# Patient Record
Sex: FEMALE | Race: OTHER | HISPANIC OR LATINO
[De-identification: names, ages, dates, MRNs, and addresses within clinical notes are randomized per-mention and may not be internally consistent; named-entity substitution may affect disease eponyms.]

---
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
## 2022-02-08 ENCOUNTER — ASOB RESULT (OUTPATIENT)
Age: 22
End: 2022-02-08

## 2022-02-08 ENCOUNTER — APPOINTMENT (OUTPATIENT)
Dept: ANTEPARTUM | Facility: CLINIC | Age: 22
End: 2022-02-08
Payer: MEDICAID

## 2022-02-08 PROCEDURE — 76820 UMBILICAL ARTERY ECHO: CPT

## 2022-02-08 PROCEDURE — 76816 OB US FOLLOW-UP PER FETUS: CPT

## 2022-02-08 PROCEDURE — 76819 FETAL BIOPHYS PROFIL W/O NST: CPT

## 2022-03-03 ENCOUNTER — ASOB RESULT (OUTPATIENT)
Age: 22
End: 2022-03-03

## 2022-03-03 ENCOUNTER — APPOINTMENT (OUTPATIENT)
Dept: ANTEPARTUM | Facility: CLINIC | Age: 22
End: 2022-03-03
Payer: MEDICAID

## 2022-03-03 PROCEDURE — 76820 UMBILICAL ARTERY ECHO: CPT

## 2022-03-03 PROCEDURE — 93976 VASCULAR STUDY: CPT

## 2022-03-03 PROCEDURE — 76816 OB US FOLLOW-UP PER FETUS: CPT

## 2022-03-03 PROCEDURE — 76821 MIDDLE CEREBRAL ARTERY ECHO: CPT

## 2022-03-10 ENCOUNTER — APPOINTMENT (OUTPATIENT)
Dept: ANTEPARTUM | Facility: CLINIC | Age: 22
End: 2022-03-10
Payer: MEDICAID

## 2022-03-10 ENCOUNTER — ASOB RESULT (OUTPATIENT)
Age: 22
End: 2022-03-10

## 2022-03-10 PROCEDURE — 76819 FETAL BIOPHYS PROFIL W/O NST: CPT

## 2022-03-10 PROCEDURE — 93976 VASCULAR STUDY: CPT

## 2022-03-10 PROCEDURE — 76820 UMBILICAL ARTERY ECHO: CPT

## 2022-03-17 ENCOUNTER — APPOINTMENT (OUTPATIENT)
Dept: ANTEPARTUM | Facility: CLINIC | Age: 22
End: 2022-03-17
Payer: MEDICAID

## 2022-03-17 ENCOUNTER — ASOB RESULT (OUTPATIENT)
Age: 22
End: 2022-03-17

## 2022-03-17 PROCEDURE — 93976 VASCULAR STUDY: CPT

## 2022-03-17 PROCEDURE — 76820 UMBILICAL ARTERY ECHO: CPT

## 2022-03-17 PROCEDURE — 76819 FETAL BIOPHYS PROFIL W/O NST: CPT

## 2022-03-24 ENCOUNTER — APPOINTMENT (OUTPATIENT)
Dept: ANTEPARTUM | Facility: CLINIC | Age: 22
End: 2022-03-24
Payer: MEDICAID

## 2022-03-24 ENCOUNTER — ASOB RESULT (OUTPATIENT)
Age: 22
End: 2022-03-24

## 2022-03-24 PROCEDURE — 76819 FETAL BIOPHYS PROFIL W/O NST: CPT

## 2022-03-24 PROCEDURE — 76820 UMBILICAL ARTERY ECHO: CPT

## 2022-03-24 PROCEDURE — 76816 OB US FOLLOW-UP PER FETUS: CPT

## 2022-03-31 ENCOUNTER — APPOINTMENT (OUTPATIENT)
Dept: ANTEPARTUM | Facility: CLINIC | Age: 22
End: 2022-03-31
Payer: MEDICAID

## 2022-03-31 ENCOUNTER — ASOB RESULT (OUTPATIENT)
Age: 22
End: 2022-03-31

## 2022-03-31 PROCEDURE — 76819 FETAL BIOPHYS PROFIL W/O NST: CPT

## 2022-03-31 PROCEDURE — 76820 UMBILICAL ARTERY ECHO: CPT

## 2022-04-07 ENCOUNTER — ASOB RESULT (OUTPATIENT)
Age: 22
End: 2022-04-07

## 2022-04-07 ENCOUNTER — APPOINTMENT (OUTPATIENT)
Dept: ANTEPARTUM | Facility: CLINIC | Age: 22
End: 2022-04-07
Payer: MEDICAID

## 2022-04-07 PROCEDURE — 76820 UMBILICAL ARTERY ECHO: CPT

## 2022-04-07 PROCEDURE — 76819 FETAL BIOPHYS PROFIL W/O NST: CPT

## 2022-04-14 ENCOUNTER — APPOINTMENT (OUTPATIENT)
Dept: ANTEPARTUM | Facility: CLINIC | Age: 22
End: 2022-04-14
Payer: MEDICAID

## 2022-04-14 ENCOUNTER — ASOB RESULT (OUTPATIENT)
Age: 22
End: 2022-04-14

## 2022-04-14 PROCEDURE — ZZZZZ: CPT

## 2022-04-14 PROCEDURE — 76818 FETAL BIOPHYS PROFILE W/NST: CPT

## 2022-04-14 PROCEDURE — 76820 UMBILICAL ARTERY ECHO: CPT

## 2022-04-14 PROCEDURE — 76816 OB US FOLLOW-UP PER FETUS: CPT

## 2022-04-18 ENCOUNTER — APPOINTMENT (OUTPATIENT)
Dept: ANTEPARTUM | Facility: CLINIC | Age: 22
End: 2022-04-18
Payer: MEDICAID

## 2022-04-18 ENCOUNTER — ASOB RESULT (OUTPATIENT)
Age: 22
End: 2022-04-18

## 2022-04-18 PROCEDURE — 76818 FETAL BIOPHYS PROFILE W/NST: CPT

## 2022-04-18 PROCEDURE — ZZZZZ: CPT

## 2022-04-19 ENCOUNTER — OUTPATIENT (OUTPATIENT)
Dept: OUTPATIENT SERVICES | Facility: HOSPITAL | Age: 22
LOS: 1 days | End: 2022-04-19
Payer: COMMERCIAL

## 2022-04-19 VITALS
RESPIRATION RATE: 16 BRPM | DIASTOLIC BLOOD PRESSURE: 62 MMHG | TEMPERATURE: 98 F | SYSTOLIC BLOOD PRESSURE: 101 MMHG | HEART RATE: 110 BPM

## 2022-04-19 VITALS — SYSTOLIC BLOOD PRESSURE: 96 MMHG | DIASTOLIC BLOOD PRESSURE: 58 MMHG | HEART RATE: 93 BPM

## 2022-04-19 DIAGNOSIS — O47.1 FALSE LABOR AT OR AFTER 37 COMPLETED WEEKS OF GESTATION: ICD-10-CM

## 2022-04-19 PROCEDURE — G0463: CPT

## 2022-04-19 PROCEDURE — 59025 FETAL NON-STRESS TEST: CPT

## 2022-04-19 NOTE — OB RN TRIAGE NOTE - FALL HARM RISK - UNIVERSAL INTERVENTIONS
Bed in lowest position, wheels locked, appropriate side rails in place/Call bell, personal items and telephone in reach/Instruct patient to call for assistance before getting out of bed or chair/Non-slip footwear when patient is out of bed/[place] to call system/Physically safe environment - no spills, clutter or unnecessary equipment/Purposeful Proactive Rounding/Room/bathroom lighting operational, light cord in reach

## 2022-04-19 NOTE — OB PROVIDER TRIAGE NOTE - NSOBPROVIDERNOTE_OBGYN_ALL_OB_FT
A/P: 21y  at 36w6d weeks GA who presents to L&D for decreased FM, pelvic pressure  VSS  50/-3 on exam, no contractions,   NST reactive will continue to monitor for 30 more minutes  BPP , good fluid noted  Fetal echo scheduled for 22, next [facility] appointment     Dispo: likely home with follow up at next scheduled appointment    d/w Dr. Vidal A/P: 21y  at 36w6d weeks GA who presents to L&D for decreased FM, pelvic pressure  VSS  50/-3 on exam, no contractions,   NST reactive will continue to monitor for 30 more minutes  BPP , good fluid noted  Fetal echo scheduled for 22, next [facility] appointment     ---------  intermittent contractions noted on monitor, exam unchanged   Dispo: home with follow up at next scheduled appointment    d/w Dr. Vidal

## 2022-04-19 NOTE — OB PROVIDER TRIAGE NOTE - HISTORY OF PRESENT ILLNESS
MANPREET MALLOY is a 21y  at 36w6d weeks GA who presents to L&D for decreased FM x 1 day. She reports that baby has not been moving as much as usual. She also endorses pelvic pressure, denies contractions, leakage of fluid, vaginal bleeding.      Pregnancy course is significant for:  PACs, fetal arrythmia, pt has fetal echo for   FGR 8%ile, resolved     POB:   PGYN: -fibroids/-cysts, denied STD hx, denies abnormal PAPs  PMH: denies  PSH:denies  SH: Denies tobacco use, EtOH use and illicit drug use during the pregnancy; Feels safe at home  Meds: PNV  All:NKDA

## 2022-04-19 NOTE — OB PROVIDER TRIAGE NOTE - NSHPPHYSICALEXAM_GEN_ALL_CORE
T(C): 36.7 (04-19-22 @ 20:17), Max: 36.7 (04-19-22 @ 20:17)  HR: 110 (04-19-22 @ 20:26) (110 - 110)  BP: 101/62 (04-19-22 @ 20:26) (101/62 - 101/62)  RR: 16 (04-19-22 @ 20:17) (16 - 16)  SpO2: --  Gen: NAD, well-appearing  Heart: S1 S2, RRR  Lungs: CTAB  Abd: soft, gravid  Ext: non-edematous, non-tender   Bedside ultrasound. Vertex, anterior placenta, BPP 8/8, ALYSSA 12.3 with 2x2 pocket  SVE: 1/50/-3  FHT: reactive  [place]: no contractions

## 2022-04-21 ENCOUNTER — ASOB RESULT (OUTPATIENT)
Age: 22
End: 2022-04-21

## 2022-04-21 ENCOUNTER — APPOINTMENT (OUTPATIENT)
Dept: ANTEPARTUM | Facility: CLINIC | Age: 22
End: 2022-04-21
Payer: MEDICAID

## 2022-04-21 PROCEDURE — 76818 FETAL BIOPHYS PROFILE W/NST: CPT

## 2022-04-21 PROCEDURE — ZZZZZ: CPT

## 2022-04-22 ENCOUNTER — OUTPATIENT (OUTPATIENT)
Dept: INPATIENT UNIT | Facility: HOSPITAL | Age: 22
LOS: 1 days | End: 2022-04-22
Payer: COMMERCIAL

## 2022-04-22 VITALS
TEMPERATURE: 98 F | DIASTOLIC BLOOD PRESSURE: 56 MMHG | HEART RATE: 78 BPM | RESPIRATION RATE: 18 BRPM | SYSTOLIC BLOOD PRESSURE: 104 MMHG

## 2022-04-22 VITALS
DIASTOLIC BLOOD PRESSURE: 56 MMHG | SYSTOLIC BLOOD PRESSURE: 104 MMHG | TEMPERATURE: 98 F | HEART RATE: 78 BPM | RESPIRATION RATE: 18 BRPM

## 2022-04-22 DIAGNOSIS — O47.1 FALSE LABOR AT OR AFTER 37 COMPLETED WEEKS OF GESTATION: ICD-10-CM

## 2022-04-22 PROCEDURE — G0463: CPT

## 2022-04-22 PROCEDURE — 59025 FETAL NON-STRESS TEST: CPT

## 2022-04-22 NOTE — OB PROVIDER TRIAGE NOTE - HISTORY OF PRESENT ILLNESS
22 yo F  at 37w2d who presents to L&D for lower abdominal pain secondary to contractions x1 day. Patient states that she started having suprapubic abdominal pain this morning at 9AM associated with contractions. Contractions occur q10 minutes.  Denies vaginal bleeding, LOF. Endorses fetal movements.     Pregnancy is significant for:  - PACs, fetal arrythmia, pt has fetal echo for   - FGR 8%ile, resolved     POB:   PGYN: -fibroids/-cysts, denied STD hx, denies abnormal PAPs  PMH: denies  PSH: denies  SH: Denies tobacco use, EtOH use and illicit drug use during the pregnancy; Feels safe at home  Meds: PNV  All: NKDA   20 yo F  at 37w2d who presents to L&D for lower abdominal pain secondary to contractions x1 day. Patient states that she started having abdominal pain this morning at 9AM associated with contractions. Contractions occur q10 minutes.  Denies vaginal bleeding, leakage of fluid. Endorses fetal movements.     Pregnancy is significant for:  - PACs, fetal arrythmia, pt has fetal echo for   - FGR 8%ile, resolved     POB:   PGYN: -fibroids/-cysts, denied STD hx, denies abnormal PAPs  PMH: denies  PSH: denies  SH: Denies tobacco use, EtOH use and illicit drug use during the pregnancy; Feels safe at home  Meds: PNV  All: NKDA

## 2022-04-22 NOTE — OB RN TRIAGE NOTE - FALL HARM RISK - UNIVERSAL INTERVENTIONS
Bed in lowest position, wheels locked, appropriate side rails in place/Call bell, personal items and telephone in reach/Instruct patient to call for assistance before getting out of bed or chair/Non-slip footwear when patient is out of bed/[place] to call system/Physically safe environment - no spills, clutter or unnecessary equipment/Purposeful Proactive Rounding/Room/bathroom lighting operational, light cord in reach

## 2022-04-22 NOTE — OB PROVIDER TRIAGE NOTE - NSOBPROVIDERNOTE_OBGYN_ALL_OB_FT
20 yo F  at 37w2d who presents to L&D for lower abdominal pain secondary to contractions x1 day. Contractions are painful, irregular, and occur q10 minutes.     Plan:  - NST reactive, toco shows irregular contractions q10 mins  - 50/-3 on SVE, which is unchanged from 22.   - Fetal echo scheduled for 22, next [facility] appointment   - Dispo: home and followup at next scheduled appoint with outpt OB 20 yo F  at 37w2d who presents to L&D for lower abdominal pain secondary to contractions x1 day. Contractions are painful, irregular, and occur q10 minutes.     Plan:  - Encourage PO fluid intake for pain   - Reassured patient that contraction pain will self-resolve and no acute interventions at the moment   - NST reactive, toco shows irregular contractions q10 mins   - 50/-3 on SVE, which is unchanged from 22  - Fetal echo scheduled for 22, next [facility] appointment   - Dispo: home and followup at next scheduled appoint with outpt OB 22 yo F  at 37w2d who presents to L&D for lower abdominal pain secondary to contractions x1 day. Contractions are irregular, and occur roughly q10 minutes.     Plan:  - Encourage PO fluid intake for pain   - Reassured patient that contractions in later stages of pregnancy are normal. no acute interventions at the moment   - NST reactive, toco shows irregular contractions q10 mins   - 1/50/-3 on SVE, which is unchanged from 22  - Fetal echo scheduled for 22, next [facility] appointment   - Dispo: home and followup at next scheduled appoint with outpt OB, Labor precautions given.    d/w Dr. Vidal

## 2022-04-22 NOTE — OB PROVIDER TRIAGE NOTE - NSHPPHYSICALEXAM_GEN_ALL_CORE
Vital Signs Last 24 Hrs  T(C): 36.4 (22 Apr 2022 18:18), Max: 36.4 (22 Apr 2022 18:12)  T(F): 97.5 (22 Apr 2022 18:18), Max: 97.52 (22 Apr 2022 18:12)  HR: 78 (22 Apr 2022 18:18) (78 - 78)  BP: 104/56 (22 Apr 2022 18:18) (104/56 - 104/56)  RR: 18 (22 Apr 2022 18:18) (18 - 18)    Gen: Well-appearing, in no acute distress   Resp: CTAB  CV: RRR  Abd: Soft, non-tender, gravid   SVE: 1/50/-3   Ext: warm, well-perfused    FHT: Baseline 150 bpm, mod enedina, +accels, -decels   [place]: Irregular contractions, q10 minutes

## 2022-04-23 PROBLEM — Z78.9 OTHER SPECIFIED HEALTH STATUS: Chronic | Status: ACTIVE | Noted: 2022-04-19

## 2022-04-25 ENCOUNTER — ASOB RESULT (OUTPATIENT)
Age: 22
End: 2022-04-25

## 2022-04-25 ENCOUNTER — APPOINTMENT (OUTPATIENT)
Dept: ANTEPARTUM | Facility: CLINIC | Age: 22
End: 2022-04-25
Payer: MEDICAID

## 2022-04-25 PROCEDURE — 76818 FETAL BIOPHYS PROFILE W/NST: CPT

## 2022-04-25 PROCEDURE — ZZZZZ: CPT

## 2022-04-28 ENCOUNTER — APPOINTMENT (OUTPATIENT)
Dept: ANTEPARTUM | Facility: CLINIC | Age: 22
End: 2022-04-28

## 2022-04-28 ENCOUNTER — APPOINTMENT (OUTPATIENT)
Dept: PEDIATRIC CARDIOLOGY | Facility: CLINIC | Age: 22
End: 2022-04-28
Payer: MEDICAID

## 2022-04-28 DIAGNOSIS — O35.9XX0 MATERNAL CARE FOR (SUSPECTED) FETAL ABNORMALITY AND DAMAGE, UNSPECIFIED, NOT APPLICABLE OR UNSPECIFIED: ICD-10-CM

## 2022-04-28 DIAGNOSIS — O36.8390 MATERNAL CARE FOR ABNORMALITIES OF THE FETAL HEART RATE OR RHYTHM, UNSPECIFIED TRIMESTER, NOT APPLICABLE OR UNSPECIFIED: ICD-10-CM

## 2022-04-28 PROCEDURE — 93325 DOPPLER ECHO COLOR FLOW MAPG: CPT | Mod: 59

## 2022-04-28 PROCEDURE — 76820 UMBILICAL ARTERY ECHO: CPT

## 2022-04-28 PROCEDURE — 76825 ECHO EXAM OF FETAL HEART: CPT

## 2022-04-28 PROCEDURE — 99204 OFFICE O/P NEW MOD 45 MIN: CPT | Mod: 25

## 2022-04-28 PROCEDURE — 76827 ECHO EXAM OF FETAL HEART: CPT

## 2022-05-02 ENCOUNTER — APPOINTMENT (OUTPATIENT)
Dept: ANTEPARTUM | Facility: CLINIC | Age: 22
End: 2022-05-02
Payer: MEDICAID

## 2022-05-02 ENCOUNTER — ASOB RESULT (OUTPATIENT)
Age: 22
End: 2022-05-02

## 2022-05-02 PROCEDURE — 76818 FETAL BIOPHYS PROFILE W/NST: CPT

## 2022-05-02 PROCEDURE — ZZZZZ: CPT

## 2022-05-03 ENCOUNTER — INPATIENT (INPATIENT)
Facility: HOSPITAL | Age: 22
LOS: 2 days | Discharge: HOME | End: 2022-05-06
Attending: OBSTETRICS & GYNECOLOGY | Admitting: OBSTETRICS & GYNECOLOGY
Payer: COMMERCIAL

## 2022-05-03 VITALS — DIASTOLIC BLOOD PRESSURE: 53 MMHG | HEART RATE: 93 BPM | SYSTOLIC BLOOD PRESSURE: 96 MMHG

## 2022-05-03 DIAGNOSIS — O26.893 OTHER SPECIFIED PREGNANCY RELATED CONDITIONS, THIRD TRIMESTER: ICD-10-CM

## 2022-05-03 DIAGNOSIS — O47.1 FALSE LABOR AT OR AFTER 37 COMPLETED WEEKS OF GESTATION: ICD-10-CM

## 2022-05-03 LAB
ABO RH CONFIRMATION: SIGNIFICANT CHANGE UP
BASOPHILS # BLD AUTO: 0.04 K/UL — SIGNIFICANT CHANGE UP (ref 0–0.2)
BASOPHILS NFR BLD AUTO: 0.3 % — SIGNIFICANT CHANGE UP (ref 0–2)
BLD GP AB SCN SERPL QL: SIGNIFICANT CHANGE UP
COVID-19 SPIKE DOMAIN AB INTERP: POSITIVE
COVID-19 SPIKE DOMAIN ANTIBODY RESULT: 172 U/ML — HIGH
EOSINOPHIL # BLD AUTO: 0.05 K/UL — SIGNIFICANT CHANGE UP (ref 0–0.5)
EOSINOPHIL NFR BLD AUTO: 0.4 % — SIGNIFICANT CHANGE UP (ref 0–6)
HCT VFR BLD CALC: 39.1 % — SIGNIFICANT CHANGE UP (ref 34.5–45)
HGB BLD-MCNC: 12.8 G/DL — SIGNIFICANT CHANGE UP (ref 11.5–15.5)
IMM GRANULOCYTES NFR BLD AUTO: 1.2 % — SIGNIFICANT CHANGE UP (ref 0–1.5)
LYMPHOCYTES # BLD AUTO: 16.2 % — SIGNIFICANT CHANGE UP (ref 13–44)
LYMPHOCYTES # BLD AUTO: 2.09 K/UL — SIGNIFICANT CHANGE UP (ref 1–3.3)
MCHC RBC-ENTMCNC: 29 PG — SIGNIFICANT CHANGE UP (ref 27–34)
MCHC RBC-ENTMCNC: 32.7 GM/DL — SIGNIFICANT CHANGE UP (ref 32–36)
MCV RBC AUTO: 88.7 FL — SIGNIFICANT CHANGE UP (ref 80–100)
MONOCYTES # BLD AUTO: 0.71 K/UL — SIGNIFICANT CHANGE UP (ref 0–0.9)
MONOCYTES NFR BLD AUTO: 5.5 % — SIGNIFICANT CHANGE UP (ref 2–14)
NEUTROPHILS # BLD AUTO: 9.84 K/UL — HIGH (ref 1.8–7.4)
NEUTROPHILS NFR BLD AUTO: 76.4 % — SIGNIFICANT CHANGE UP (ref 43–77)
PLATELET # BLD AUTO: 355 K/UL — SIGNIFICANT CHANGE UP (ref 150–400)
RBC # BLD: 4.41 M/UL — SIGNIFICANT CHANGE UP (ref 3.8–5.2)
RBC # FLD: 12.3 % — SIGNIFICANT CHANGE UP (ref 10.3–14.5)
SARS-COV-2 IGG+IGM SERPL QL IA: 172 U/ML — HIGH
SARS-COV-2 IGG+IGM SERPL QL IA: POSITIVE
SARS-COV-2 RNA SPEC QL NAA+PROBE: SIGNIFICANT CHANGE UP
WBC # BLD: 12.88 K/UL — HIGH (ref 3.8–10.5)
WBC # FLD AUTO: 12.88 K/UL — HIGH (ref 3.8–10.5)

## 2022-05-03 RX ORDER — SODIUM CHLORIDE 9 MG/ML
1000 INJECTION, SOLUTION INTRAVENOUS
Refills: 0 | Status: DISCONTINUED | OUTPATIENT
Start: 2022-05-03 | End: 2022-05-04

## 2022-05-03 RX ORDER — OXYTOCIN 10 UNIT/ML
333.33 VIAL (ML) INJECTION
Qty: 20 | Refills: 0 | Status: COMPLETED | OUTPATIENT
Start: 2022-05-03 | End: 2022-05-03

## 2022-05-03 RX ORDER — CITRIC ACID/SODIUM CITRATE 300-500 MG
30 SOLUTION, ORAL ORAL ONCE
Refills: 0 | Status: DISCONTINUED | OUTPATIENT
Start: 2022-05-03 | End: 2022-05-04

## 2022-05-03 RX ORDER — OXYTOCIN 10 UNIT/ML
2 VIAL (ML) INJECTION
Qty: 30 | Refills: 0 | Status: DISCONTINUED | OUTPATIENT
Start: 2022-05-03 | End: 2022-05-06

## 2022-05-03 RX ADMIN — SODIUM CHLORIDE 125 MILLILITER(S): 9 INJECTION, SOLUTION INTRAVENOUS at 16:32

## 2022-05-03 RX ADMIN — Medication 2 MILLIUNIT(S)/MIN: at 17:45

## 2022-05-03 RX ADMIN — SODIUM CHLORIDE 125 MILLILITER(S): 9 INJECTION, SOLUTION INTRAVENOUS at 21:40

## 2022-05-03 NOTE — OB RN PATIENT PROFILE - FALL HARM RISK - RISK INTERVENTIONS

## 2022-05-03 NOTE — OB PROVIDER LABOR PROGRESS NOTE - ASSESSMENT
Cat 2 tracing --> after repositioning Cat 1   - VSS  - Pitocin currently at 2   - Epidural in place   - consider amniotomy with next exam   - continue to monitor    D/w Dr. Vidal

## 2022-05-03 NOTE — OB RN TRIAGE NOTE - FALL HARM RISK - RISK INTERVENTIONS

## 2022-05-03 NOTE — OB PROVIDER H&P - ASSESSMENT
-admit to L&D  -routine labs  -GBS negative  -continuous FHT/TOCO  -Pitocin for IOL  -IV fluids  -discussed pain management: patient eventually wants epidural   -discussed with Dr. Manzo

## 2022-05-03 NOTE — OB RN PATIENT PROFILE - FALL HARM RISK - TYPE OF ASSESSMENT
Called and spoke to Sierra Lundborg with [facility] line, she will reach out to the patient with date and time after speaking with Della  Admission

## 2022-05-03 NOTE — OB RN PATIENT PROFILE - LANGUAGE ASSISTANCE NEEDED
admission done in Ukrainian by DELTA Hill RN/Yes-Patient/Caregiver accepts free interpretation services...

## 2022-05-03 NOTE — OB PROVIDER TRIAGE NOTE - NSHPPHYSICALEXAM_GEN_ALL_CORE
T(C): 36.8 (05-03-22 @ 13:50), Max: 36.8 (05-03-22 @ 13:50)  HR: 93 (05-03-22 @ 13:50) (93 - 93)  BP: 96/53 (05-03-22 @ 13:50) (96/53 - 96/53)  RR: 18 (05-03-22 @ 13:50) (18 - 18)  SpO2: --    Gen: NAD, well-appearing  Heart: S1 S2, RRR  Lungs: CTAB  Abd: soft, gravid  Ext: non-edematous, non-tender   SVE: 2/60/-2    FHT: baseline 150bpm, moderate variability, positive accelerations, no decelerations  [place]: none  BPP: 10/10. NST reactive, amniotic fluid pocket >2cm, fetal tone, movement, and breathing observed.

## 2022-05-03 NOTE — OB PROVIDER TRIAGE NOTE - HISTORY OF PRESENT ILLNESS
MANPREET MALLOY is a 20y/o  at 38w6d GA who presents to L&D for decreased fetal movement. Patient was seen in clinic today for a scheduled appointment and was told to come to the hospital because they were unable to get a good fetal tracing. Patient also reports decreased fetal movement but that she felt some on the car ride to the hospital. She states that she has had lower abdominal period-like cramping since last night (6/10 in severity) as well as some white, mucoid discharge. She denies any fever, trauma, vaginal bleeding, contractions, or leakage of fluid.    Pregnancy course is significant for:  PACs, fetal arrythmia, pt had fetal echo for  confirming PACs  FGR 8%ile, resolved     POB:   PGYN: -fibroids/-cysts, denied STD hx, denies abnormal PAPs  PMH: none  PSH: none  SH: Denies tobacco use, EtOH use and illicit drug use during the pregnancy; Feels safe at home  Meds: PNV  All: NKDA

## 2022-05-03 NOTE — OB PROVIDER H&P - NSHPPHYSICALEXAM_GEN_ALL_CORE
T(C): 36.8 (05-03-22 @ 13:50), Max: 36.8 (05-03-22 @ 13:50)  HR: 93 (05-03-22 @ 13:50) (93 - 93)  BP: 96/53 (05-03-22 @ 13:50) (96/53 - 96/53)  RR: 18 (05-03-22 @ 13:50) (18 - 18)    Gen: NAD, well-appearing  Heart: S1 S2, RRR  Lungs: CTAB  Abd: soft, gravid  Ext: non-edematous, non-tender   SVE: 2/60/-2    FHT: baseline 150bpm, moderate variability, positive accelerations, no decelerations  [place]: none  BPP: 10/10. NST reactive, amniotic fluid pocket >2cm, fetal tone, movement, and breathing observed.

## 2022-05-03 NOTE — OB PROVIDER TRIAGE NOTE - NSOBPROVIDERNOTE_OBGYN_ALL_OB_FT
A/P: 21y  at 38w6d GA who presents to L&D for decreased fetal movement. 260/-2 on exam with no contractions. BPP 10/10.     Dispo:     Discussed with

## 2022-05-03 NOTE — OB PROVIDER H&P - ATTENDING COMMENTS
22y/o  at 38w6d GA who presents to L&D for decreased fetal movement. Patient was seen in clinic today for a scheduled appointment and was told to come to the hospital because they were unable to get a good fetal tracing. Pregnancy complicated by fetal arrhythmia, PACs  VSS  FHT - interrupted with audible arrhythmia  TOco - irregular contractions  SVE 3/60/-3  22 y/o  @ 38+6 with fetal arrhythmia and decreased fetal movement   - given term gestation, plan to keep for IOL  - consent obtained    Yee Manzo MD

## 2022-05-04 LAB
MEV IGG SER-ACNC: 142 AU/ML — SIGNIFICANT CHANGE UP
MEV IGG+IGM SER-IMP: POSITIVE — SIGNIFICANT CHANGE UP
T PALLIDUM AB TITR SER: NEGATIVE — SIGNIFICANT CHANGE UP

## 2022-05-04 RX ORDER — IBUPROFEN 200 MG
600 TABLET ORAL EVERY 6 HOURS
Refills: 0 | Status: DISCONTINUED | OUTPATIENT
Start: 2022-05-04 | End: 2022-05-06

## 2022-05-04 RX ORDER — TETANUS TOXOID, REDUCED DIPHTHERIA TOXOID AND ACELLULAR PERTUSSIS VACCINE, ADSORBED 5; 2.5; 8; 8; 2.5 [IU]/.5ML; [IU]/.5ML; UG/.5ML; UG/.5ML; UG/.5ML
0.5 SUSPENSION INTRAMUSCULAR ONCE
Refills: 0 | Status: COMPLETED | OUTPATIENT
Start: 2022-05-04

## 2022-05-04 RX ORDER — MAGNESIUM HYDROXIDE 400 MG/1
30 TABLET, CHEWABLE ORAL
Refills: 0 | Status: DISCONTINUED | OUTPATIENT
Start: 2022-05-04 | End: 2022-05-06

## 2022-05-04 RX ORDER — SIMETHICONE 80 MG/1
80 TABLET, CHEWABLE ORAL EVERY 4 HOURS
Refills: 0 | Status: DISCONTINUED | OUTPATIENT
Start: 2022-05-04 | End: 2022-05-06

## 2022-05-04 RX ORDER — DIPHENHYDRAMINE HCL 50 MG
25 CAPSULE ORAL EVERY 6 HOURS
Refills: 0 | Status: DISCONTINUED | OUTPATIENT
Start: 2022-05-04 | End: 2022-05-06

## 2022-05-04 RX ORDER — OXYCODONE HYDROCHLORIDE 5 MG/1
5 TABLET ORAL ONCE
Refills: 0 | Status: DISCONTINUED | OUTPATIENT
Start: 2022-05-04 | End: 2022-05-06

## 2022-05-04 RX ORDER — HYDROCORTISONE 1 %
1 OINTMENT (GRAM) TOPICAL EVERY 6 HOURS
Refills: 0 | Status: DISCONTINUED | OUTPATIENT
Start: 2022-05-04 | End: 2022-05-06

## 2022-05-04 RX ORDER — OXYCODONE HYDROCHLORIDE 5 MG/1
5 TABLET ORAL
Refills: 0 | Status: DISCONTINUED | OUTPATIENT
Start: 2022-05-04 | End: 2022-05-06

## 2022-05-04 RX ORDER — LANOLIN
1 OINTMENT (GRAM) TOPICAL EVERY 6 HOURS
Refills: 0 | Status: DISCONTINUED | OUTPATIENT
Start: 2022-05-04 | End: 2022-05-06

## 2022-05-04 RX ORDER — BENZOCAINE 10 %
1 GEL (GRAM) MUCOUS MEMBRANE EVERY 6 HOURS
Refills: 0 | Status: DISCONTINUED | OUTPATIENT
Start: 2022-05-04 | End: 2022-05-06

## 2022-05-04 RX ORDER — SODIUM CHLORIDE 9 MG/ML
3 INJECTION INTRAMUSCULAR; INTRAVENOUS; SUBCUTANEOUS EVERY 8 HOURS
Refills: 0 | Status: DISCONTINUED | OUTPATIENT
Start: 2022-05-04 | End: 2022-05-06

## 2022-05-04 RX ORDER — ACETAMINOPHEN 500 MG
975 TABLET ORAL
Refills: 0 | Status: DISCONTINUED | OUTPATIENT
Start: 2022-05-04 | End: 2022-05-06

## 2022-05-04 RX ORDER — KETOROLAC TROMETHAMINE 30 MG/ML
30 SYRINGE (ML) INJECTION ONCE
Refills: 0 | Status: DISCONTINUED | OUTPATIENT
Start: 2022-05-04 | End: 2022-05-04

## 2022-05-04 RX ORDER — AER TRAVELER 0.5 G/1
1 SOLUTION RECTAL; TOPICAL EVERY 4 HOURS
Refills: 0 | Status: DISCONTINUED | OUTPATIENT
Start: 2022-05-04 | End: 2022-05-06

## 2022-05-04 RX ORDER — PRAMOXINE HYDROCHLORIDE 150 MG/15G
1 AEROSOL, FOAM RECTAL EVERY 4 HOURS
Refills: 0 | Status: DISCONTINUED | OUTPATIENT
Start: 2022-05-04 | End: 2022-05-06

## 2022-05-04 RX ORDER — DIBUCAINE 1 %
1 OINTMENT (GRAM) RECTAL EVERY 6 HOURS
Refills: 0 | Status: DISCONTINUED | OUTPATIENT
Start: 2022-05-04 | End: 2022-05-06

## 2022-05-04 RX ORDER — OXYTOCIN 10 UNIT/ML
333.33 VIAL (ML) INJECTION
Qty: 20 | Refills: 0 | Status: DISCONTINUED | OUTPATIENT
Start: 2022-05-04 | End: 2022-05-06

## 2022-05-04 RX ORDER — IBUPROFEN 200 MG
600 TABLET ORAL EVERY 6 HOURS
Refills: 0 | Status: COMPLETED | OUTPATIENT
Start: 2022-05-04 | End: 2023-04-02

## 2022-05-04 RX ADMIN — Medication 0.2 MILLIGRAM(S): at 12:13

## 2022-05-04 RX ADMIN — Medication 2 MILLIUNIT(S)/MIN: at 01:04

## 2022-05-04 RX ADMIN — Medication 30 MILLIGRAM(S): at 13:10

## 2022-05-04 RX ADMIN — Medication 30 MILLIGRAM(S): at 13:03

## 2022-05-04 RX ADMIN — SODIUM CHLORIDE 3 MILLILITER(S): 9 INJECTION INTRAMUSCULAR; INTRAVENOUS; SUBCUTANEOUS at 22:54

## 2022-05-04 RX ADMIN — Medication 600 MILLIGRAM(S): at 18:17

## 2022-05-04 RX ADMIN — SODIUM CHLORIDE 125 MILLILITER(S): 9 INJECTION, SOLUTION INTRAVENOUS at 01:03

## 2022-05-04 RX ADMIN — Medication 1000 MILLIUNIT(S)/MIN: at 12:07

## 2022-05-04 NOTE — OB PROVIDER DELIVERY SUMMARY - NSPROVIDERDELIVERYNOTE_OBGYN_ALL_OB_FT
Patient delivered a viable male infant in ROP position. The delivery of the shoulders and the body followed the delivery of the head, uncomplicated. 30sec delayed cord clamping.  on maternal chest. Will be admitted to NICU for cardial eval.  Placenta delivered spontaneously, intact. birthing canal intact.  Methergine and Cytotec given for enhanced hemostasis. Patient delivered a viable male infant in ROP position. The delivery of the shoulders and the body followed the delivery of the head, uncomplicated. 30sec delayed cord clamping.  on maternal chest. Will be admitted to NICU for cardial eval due to known fetal tachycardia.  Placenta delivered spontaneously, intact. birthing canal intact.  Methergine and Cytotec given for enhanced hemostasis.    I was present throughout entire delivery. Mother and baby in room in stable condition  ppalos

## 2022-05-04 NOTE — OB PROVIDER DELIVERY SUMMARY - NSSELHIDDEN_OBGYN_ALL_OB_FT
[NS_DeliveryAttending1_OBGYN_ALL_OB_FT:[email or web address]=],[NS_DeliveryRN_OBGYN_ALL_OB_FT:[id number]=],[NS_DeliveryAssist1_OBGYN_ALL_OB_FT:[id number]=]

## 2022-05-04 NOTE — OB RN DELIVERY SUMMARY - NSSELHIDDEN_OBGYN_ALL_OB_FT
[NS_DeliveryAttending1_OBGYN_ALL_OB_FT:[id number]=],[NS_DeliveryRN_OBGYN_ALL_OB_FT:[id number]] [NS_DeliveryAttending1_OBGYN_ALL_OB_FT:[email or web address]=],[NS_DeliveryRN_OBGYN_ALL_OB_FT:[id number]=],[NS_DeliveryAssist1_OBGYN_ALL_OB_FT:[id number]=]

## 2022-05-04 NOTE — OB PROVIDER LABOR PROGRESS NOTE - ASSESSMENT
Cat 2 tracing   - VSS  - patient repositioned   - Forebag ruptured after SROM noted at 0015, light meconium in color   - Pitocin at 4   - Epidural in place   - continue to monitor    -D/w Dr. Vidal

## 2022-05-04 NOTE — OB PROVIDER LABOR PROGRESS NOTE - ASSESSMENT
A/P  c./w current management  Epidural top off A/P  c./w current management  Epidural top off      attending addendum: late entry  I have met patient and reviewed medical and obstetrical hx  Admitted for DFM and PAC's  Uncomfortable so top off was given by anesthesia team  FSE place since we couldn't rule out deceleration  PAC's picked up on the FSE, kept for about 30min hard to read however no decels noted, so we switched back to sono  +accels, moderate variability  9/100/0  continue current mgmt  reassess in 1hr or prn  ppalos

## 2022-05-04 NOTE — OB PROVIDER LABOR PROGRESS NOTE - NS_SUBJECTIVE/OBJECTIVE_OBGYN_ALL_OB_FT
Pt doing well.
Patient seen and examined at bedside, uncomfortable with
Patient endorsing mild pressure.
Patient comfortable s/p Epidural.    Vital Signs Last 24 Hrs  T(C): 36.6 (03 May 2022 19:26), Max: 36.9 (03 May 2022 15:55)  T(F): 97.88 (03 May 2022 19:26), Max: 98.42 (03 May 2022 15:58)  HR: 79 (03 May 2022 22:23) (73 - 109)  BP: 90/55 (03 May 2022 22:23) (89/52 - 114/73)  BP(mean): --  RR: 17 (03 May 2022 22:15) (17 - 18)  SpO2: 99% (03 May 2022 22:23) (97% - 100%)

## 2022-05-04 NOTE — OB PROVIDER LABOR PROGRESS NOTE - NS_OBIHIFHRDETAILS_OBGYN_ALL_OB_FT
150bpm, cat 1
150, moderate variability, + accelerations, no decelerations, audible arrhythmia.
155 bpm, moderate variability, +accels, nonrecurrent variable deccels
140 bpm, moderate variability, +Accels, intermittent variable deccels

## 2022-05-04 NOTE — CHART NOTE - NSCHARTNOTEFT_GEN_A_CORE
attending progress note  Pushing with contractions  FHT: Cat 1  [place]: every 3min  A/P: Term IOL for fetal PAC's and DFM. Maternal/fetal status reassuring  -continue pushing

## 2022-05-04 NOTE — OB RN DELIVERY SUMMARY - NS_SEPSISRSKCALC_OBGYN_ALL_OB_FT
EOS calculated successfully. EOS Risk Factor: 0.5/1000 live births ([facility] national incidence); GA=39w;Temp=98.42; ROM=11.917; GBS='Negative'; Antibiotics='No antibiotics or any antibiotics < 2 hrs prior to birth'

## 2022-05-05 ENCOUNTER — APPOINTMENT (OUTPATIENT)
Dept: ANTEPARTUM | Facility: CLINIC | Age: 22
End: 2022-05-05

## 2022-05-05 LAB
COVID-19 SPIKE DOMAIN AB INTERP: POSITIVE
COVID-19 SPIKE DOMAIN ANTIBODY RESULT: 133 U/ML — HIGH
HCT VFR BLD CALC: 33.6 % — LOW (ref 34.5–45)
HGB BLD-MCNC: 11 G/DL — LOW (ref 11.5–15.5)
SARS-COV-2 IGG+IGM SERPL QL IA: 133 U/ML — HIGH
SARS-COV-2 IGG+IGM SERPL QL IA: POSITIVE

## 2022-05-05 RX ADMIN — Medication 600 MILLIGRAM(S): at 11:31

## 2022-05-05 RX ADMIN — Medication 975 MILLIGRAM(S): at 15:31

## 2022-05-05 RX ADMIN — Medication 975 MILLIGRAM(S): at 09:06

## 2022-05-05 RX ADMIN — Medication 600 MILLIGRAM(S): at 18:01

## 2022-05-05 RX ADMIN — Medication 1 TABLET(S): at 11:31

## 2022-05-05 NOTE — PROGRESS NOTE ADULT - ASSESSMENT
A/P: MANPREET MALLOY is a 21y  s/p   PPD1  - Stable, doing well postpartum  - Hgb 12.8 > 11.0  - Pain: well controlled on PO pain meds  - GI: Regular diet  - : voiding  - DVT prophylaxis: ambulate  - Dispo: Continue inpatient care

## 2022-05-05 NOTE — PROGRESS NOTE ADULT - SUBJECTIVE AND OBJECTIVE BOX
MANPREET MALLOY is a 21y  s/p   PPD1    SUBJECTIVE:  Patient has no complaints, no acute events overnight.  Pain is well controlled with PRN pain medication.   She is ambulating, voiding, tolerating PO. Denies N/V.  minimal lochia.      OBJECTIVE:  Physical exam:  General: AOx3, NAD.  Heart: Regular, rate, and rhythm  Lungs: CTAB  Abdomen: Soft, appropriately tender to palpitation, firm uterine fundus at umbilicus.  Vaginal: minimal blood on pad, no bleeding on palpation of fundus  Ext: No DVT signs, warm extremities.    Vital Signs Last 24 Hrs  T(C): 36.8 (05 May 2022 04:27), Max: 37.1 (04 May 2022 14:55)  T(F): 98.3 (05 May 2022 04:27), Max: 98.7 (04 May 2022 14:55)  HR: 76 (05 May 2022 04:27) (65 - 130)  BP: 90/58 (05 May 2022 04:27) (88/54 - 230/126)  BP(mean): 96 (04 May 2022 13:55) (85 - 103)  RR: 16 (05 May 2022 04:27) (16 - 18)  SpO2: 97% (05 May 2022 04:27) (92% - 100%)    LABS:                        11.0   x     )-----------( x        ( 05 May 2022 06:21 )             33.6

## 2022-05-06 ENCOUNTER — TRANSCRIPTION ENCOUNTER (OUTPATIENT)
Age: 22
End: 2022-05-06

## 2022-05-06 VITALS
RESPIRATION RATE: 16 BRPM | DIASTOLIC BLOOD PRESSURE: 63 MMHG | SYSTOLIC BLOOD PRESSURE: 103 MMHG | TEMPERATURE: 98 F | HEART RATE: 80 BPM | OXYGEN SATURATION: 98 %

## 2022-05-06 RX ORDER — ACETAMINOPHEN 500 MG
2 TABLET ORAL
Qty: 60 | Refills: 0
Start: 2022-05-06 | End: 2022-05-15

## 2022-05-06 RX ORDER — TETANUS TOXOID, REDUCED DIPHTHERIA TOXOID AND ACELLULAR PERTUSSIS VACCINE, ADSORBED 5; 2.5; 8; 8; 2.5 [IU]/.5ML; [IU]/.5ML; UG/.5ML; UG/.5ML; UG/.5ML
0.5 SUSPENSION INTRAMUSCULAR ONCE
Refills: 0 | Status: COMPLETED | OUTPATIENT
Start: 2022-05-06 | End: 2022-05-06

## 2022-05-06 RX ORDER — IBUPROFEN 200 MG
1 TABLET ORAL
Qty: 40 | Refills: 0
Start: 2022-05-06 | End: 2022-05-15

## 2022-05-06 RX ADMIN — Medication 975 MILLIGRAM(S): at 02:56

## 2022-05-06 RX ADMIN — TETANUS TOXOID, REDUCED DIPHTHERIA TOXOID AND ACELLULAR PERTUSSIS VACCINE, ADSORBED 0.5 MILLILITER(S): 5; 2.5; 8; 8; 2.5 SUSPENSION INTRAMUSCULAR at 11:56

## 2022-05-06 NOTE — DISCHARGE NOTE OB - PATIENT PORTAL LINK FT
You can access the FollowMyHealth Patient Portal offered by [facility] by registering at the following website: [email or web address]. By joining [facility]’s FollowMyHealth portal, you will also be able to view your health information using other applications (apps) compatible with our system.

## 2022-05-06 NOTE — PROGRESS NOTE ADULT - ASSESSMENT
A/P:  Patient is a 22yo  now PPD#2 s/p spontaneous vaginal delivery at 39 weeks gestation after induction for decreased fetal movement.    -Vital signs stable  -Postpartum H&H stable  -Voiding, tolerating PO, bowel function nml   -Advance care as tolerated   -Continue routine postpartum care and education.  -Healthy male infant, declines circumcision.  -Stable for discharge home pending OB attending approval and pediatrics clearance

## 2022-05-06 NOTE — PROGRESS NOTE ADULT - SUBJECTIVE AND OBJECTIVE BOX
MANPREET MALLOY is a 20yo  now PPD#2 s/p spontaneous vaginal delivery at 39 weeks gestation after induction for decreased fetal movement.    S:    The patient has no complaints.  Pain controlled with current medications.   She is ambulating without difficulty and tolerating PO   + flatus/-BM/+ voiding     O:    T(C): 36.7 (22 @ 04:16), Max: 36.8 (22 @ 15:20)  HR: 80 (22 @ 04:16) (76 - 80)  BP: 103/63 (22 @ 04:16) (98/55 - 103/63)  RR: 16 (22 @ 04:16) (16 - 18)  SpO2: 98% (22 @ 04:16) (98% - 100%)    Gen: NAD, AOx3  CV: RRR  Pulm: CTAB  Breast: nontender, non-engorged   Abdomen:  soft, non-tender, non-distended, +bowel sounds.  Uterus:  Fundus firm below umbilicus  VE:  +lochia  Ext:  mild symmetric b/l LE edema, no erythema or tenderness                          11.0   x     )-----------( x        ( 05 May 2022 06:21 )             33.6

## 2022-05-06 NOTE — DISCHARGE NOTE OB - CARE PROVIDER_API CALL
Jesse Ferrari)  Obstetrics and Gynecology  [address] [place] [address]  Phone: [phone]  Fax: [phone]  Follow Up Time:

## 2022-05-06 NOTE — PROGRESS NOTE ADULT - ATTENDING COMMENTS
PPD#2  Doing well  meeting milestones  no signs/sx's of anemia  f/u in the office in 4-6 weeks  discharge home today  ppalos

## 2022-05-09 ENCOUNTER — APPOINTMENT (OUTPATIENT)
Dept: ANTEPARTUM | Facility: CLINIC | Age: 22
End: 2022-05-09

## 2022-05-12 ENCOUNTER — APPOINTMENT (OUTPATIENT)
Dept: ANTEPARTUM | Facility: CLINIC | Age: 22
End: 2022-05-12

## 2022-05-16 ENCOUNTER — NON-APPOINTMENT (OUTPATIENT)
Age: 22
End: 2022-05-16

## 2022-05-30 PROCEDURE — 86901 BLOOD TYPING SEROLOGIC RH(D): CPT

## 2022-05-30 PROCEDURE — 85014 HEMATOCRIT: CPT

## 2022-05-30 PROCEDURE — 86765 RUBEOLA ANTIBODY: CPT

## 2022-05-30 PROCEDURE — 85018 HEMOGLOBIN: CPT

## 2022-05-30 PROCEDURE — 86900 BLOOD TYPING SEROLOGIC ABO: CPT

## 2022-05-30 PROCEDURE — 86780 TREPONEMA PALLIDUM: CPT

## 2022-05-30 PROCEDURE — 36415 COLL VENOUS BLD VENIPUNCTURE: CPT

## 2022-05-30 PROCEDURE — 86769 SARS-COV-2 COVID-19 ANTIBODY: CPT

## 2022-05-30 PROCEDURE — 86850 RBC ANTIBODY SCREEN: CPT

## 2022-05-30 PROCEDURE — 90715 TDAP VACCINE 7 YRS/> IM: CPT

## 2022-05-30 PROCEDURE — U0005: CPT

## 2022-05-30 PROCEDURE — 85025 COMPLETE CBC W/AUTO DIFF WBC: CPT

## 2022-05-30 PROCEDURE — U0003: CPT

## 2022-10-05 NOTE — OB RN PATIENT PROFILE - EDUCATION ON THE POTENTIAL RISKS AND IMPACT OF EARLY USE OF PACIFIERS ON THE ESTABLISHMENT OF BREASTFEEDING
Chief Complaint   Patient presents with    Follow-Up     Last seen 08/26/22    Results     PFT 09/20/22, CT-Chest 09/02/22         HPI: This patient is a 76 y.o. female whom is followed in our clinic for recent dx of adenocarcinoma last seen by me on 8/26/22 for initial consults. PMHs includes mild RAD on prn albuterol only and PND with associated chronic productive cough. She is a former smoker with 34 pk year hx and quit in roughly 2005. She rides horses daily and is active w/o  SOB. No family hx of autoimmune dz or Ca. Pt lung cancer screening CT in 11/2019 showing RUL 2.2 cm GGO. Repeat CT in 6/2020 showed stability however no surveillance imaging was performed in 2021 and CT 7/2022 showed increase in RUL GGO to up to 3 cm in diameter now with solid component. A PET form 8/10 showed low level FDG uptake with SUV of 4.86 and mild uptake in 9 mm paratracheal LN as well as smaller 7 mm paratracheal node. She had bronchscopy with transbronchial bx demonstrating adenoCa however there was unsuccessful attempt to biopsy LN with EBUS and she underwent mediastinoscopy with Dr Ganser which revealed no e/o metastatic disease. She is established with oncology with plans for surveillance imaging every 3 mos. Pt mediastinsocopy was only 9/26 and she is still recovering from surgery with pain in her neck and lateral R ribs. She does feel SOB and wants to get back to horseback riding . Pre-operative PFTs were essentially normal other than mild obstruction with +BD response.     Past Medical History:   Diagnosis Date    Anesthesia     Asthma     Cancer (HCC)     Lung    Cough     Depression     PONV (postoperative nausea and vomiting)     Shortness of breath     Tobacco use 09/17/2019       Social History     Socioeconomic History    Marital status:      Spouse name: Not on file    Number of children: Not on file    Years of education: Not on file    Highest education level: Bachelor's degree (e.g., BA, AB, BS)    Occupational History    Not on file   Tobacco Use    Smoking status: Former     Packs/day: 1.00     Years: 34.00     Pack years: 34.00     Types: Cigarettes     Start date: 1968     Quit date: 2005     Years since quittin.7     Passive exposure: Past    Smokeless tobacco: Never   Vaping Use    Vaping Use: Never used   Substance and Sexual Activity    Alcohol use: Yes     Alcohol/week: 1.2 oz     Types: 2 Glasses of wine per week     Comment: 2/day    Drug use: Yes     Types: Marijuana     Comment: 1 gummy every month maybe - last used 2022    Sexual activity: Yes     Partners: Male     Comment: I am menopausal   Other Topics Concern    Not on file   Social History Narrative    Not on file     Social Determinants of Health     Financial Resource Strain: Low Risk     Difficulty of Paying Living Expenses: Not hard at all   Food Insecurity: No Food Insecurity    Worried About Running Out of Food in the Last Year: Never true    Ran Out of Food in the Last Year: Never true   Transportation Needs: No Transportation Needs    Lack of Transportation (Medical): No    Lack of Transportation (Non-Medical): No   Physical Activity: Sufficiently Active    Days of Exercise per Week: 5 days    Minutes of Exercise per Session: 30 min   Stress: No Stress Concern Present    Feeling of Stress : Only a little   Social Connections: Moderately Integrated    Frequency of Communication with Friends and Family: Twice a week    Frequency of Social Gatherings with Friends and Family: Once a week    Attends Jainism Services: Never    Active Member of Clubs or Organizations: Yes    Attends Club or Organization Meetings: More than 4 times per year    Marital Status:    Intimate Partner Violence: Not on file   Housing Stability: Low Risk     Unable to Pay for Housing in the Last Year: No    Number of Places Lived in the Last Year: 1    Unstable Housing in the Last Year: No       Family History   Problem Relation Age of  Onset    Dementia Mother     Heart Attack Mother     Cancer Sister 81        Lung Cancer, smoker    Hypertension Sister     Lung Disease Sister     Hypertension Brother     Arthritis Brother         RA    Cancer Paternal Aunt         Lung    Cancer Paternal Uncle         Lung       Current Outpatient Medications on File Prior to Visit   Medication Sig Dispense Refill    azelastine (ASTELIN) 137 MCG/SPRAY nasal spray Administer 1 Spray into affected nostril(S) every day.      NON SPECIFIED Take 1 Tablet by mouth every day. Bio Identical Hormones      venlafaxine XR (EFFEXOR XR) 75 MG CAPSULE SR 24 HR Take 1 Capsule by mouth every day for 90 days. 90 Capsule 2    Levomefolate Glucosamine (METHYLFOLATE PO) Take 1 Tablet by mouth every day.      SELENIUM PO Take 1 Tablet by mouth every day.      levothyroxine (SYNTHROID) 25 MCG Tab Take 25 mcg by mouth every morning on an empty stomach.      albuterol 108 (90 Base) MCG/ACT Aero Soln inhalation aerosol INHALE 2 PUFFS BY MOUTH EVERY 4 HOURS AS NEEDED FOR SHORTNESS OF BREATH 8.5 g 0    Cyanocobalamin (B-12) 100 MCG Tab Take 1 Tablet by mouth every day.      B Complex Vitamins (B COMPLEX-B12 PO) Take 1 Tab by mouth every day.      cholecalciferol (DIALYVITE VITAMIN D 5000) 5000 UNIT Cap Take 5,000 Units by mouth every day.      Ascorbic Acid (VITAMIN C) 1000 MG Tab Take 1 Tab by mouth every day.      NON SPECIFIED Take 1 Tab by mouth every day. BIO-FLEX       No current facility-administered medications on file prior to visit.       Sulfa drugs      ROS:   Review of Systems   Constitutional:  Negative for chills, diaphoresis, fever, malaise/fatigue and weight loss.   HENT:  Negative for congestion, ear discharge, ear pain, hearing loss, nosebleeds, sinus pain, sore throat and tinnitus.    Eyes:  Negative for blurred vision, double vision, photophobia, pain, discharge and redness.   Respiratory:  Negative for cough, hemoptysis, sputum production, shortness of breath,  "wheezing and stridor.    Cardiovascular:  Negative for chest pain, palpitations, orthopnea, claudication, leg swelling and PND.   Gastrointestinal:  Negative for abdominal pain, constipation, diarrhea, heartburn, nausea and vomiting.   Genitourinary:  Negative for dysuria and urgency.   Musculoskeletal:  Negative for back pain, falls, joint pain, myalgias and neck pain.   Skin:  Negative for itching and rash.   Neurological:  Negative for dizziness, tremors, speech change, focal weakness, weakness and headaches.   Endo/Heme/Allergies:  Negative for environmental allergies.   Psychiatric/Behavioral:  Negative for depression.      /84 (BP Location: Right arm, Patient Position: Sitting, BP Cuff Size: Adult)   Pulse 64   Ht 1.702 m (5' 7\")   Wt 69.9 kg (154 lb)   SpO2 96%   Physical Exam  Constitutional:       General: She is not in acute distress.     Appearance: Normal appearance. She is well-developed and normal weight.   HENT:      Head: Normocephalic and atraumatic.      Right Ear: External ear normal.      Left Ear: External ear normal.      Nose: Nose normal. No congestion.      Mouth/Throat:      Mouth: Mucous membranes are moist.      Pharynx: Oropharynx is clear. No oropharyngeal exudate.   Eyes:      General: No scleral icterus.     Extraocular Movements: Extraocular movements intact.      Conjunctiva/sclera: Conjunctivae normal.      Pupils: Pupils are equal, round, and reactive to light.   Neck:      Vascular: No JVD.      Trachea: No tracheal deviation.   Cardiovascular:      Rate and Rhythm: Normal rate and regular rhythm.      Heart sounds: Normal heart sounds. No murmur heard.    No friction rub. No gallop.   Pulmonary:      Effort: Pulmonary effort is normal. No accessory muscle usage or respiratory distress.      Breath sounds: No wheezing or rales.      Comments: Bronchial breath sounds RLL  Abdominal:      General: There is no distension.      Palpations: Abdomen is soft.      Tenderness: " There is no abdominal tenderness.   Musculoskeletal:         General: No tenderness or deformity. Normal range of motion.      Cervical back: Neck supple.      Right lower leg: No edema.      Left lower leg: No edema.   Lymphadenopathy:      Cervical: No cervical adenopathy.   Skin:     General: Skin is warm and dry.      Findings: No rash.      Nails: There is no clubbing.   Neurological:      Mental Status: She is alert and oriented to person, place, and time.      Cranial Nerves: No cranial nerve deficit.      Gait: Gait normal.   Psychiatric:         Behavior: Behavior normal.       PFTs as reviewed by me personally: as per hPI    Imaging as reviewed by me personally:      Assessment:  1. Adenocarcinoma (HCC)        2. Mild intermittent asthma without complication            Plan:  1. s/p surgical resection; I think most of her sxs are normal for acute post op and she sees Dr Ganser next week.   2. Chronic, mild. Will plan to repeat PFTs after a period of recovery. She has albuterol prn but I gave her a trial of stiolto post op to help with bronchoconstriction  Return in about 4 months (around 2/5/2023) for SOB, cough.         Statement Selected

## 2024-08-02 ENCOUNTER — APPOINTMENT (OUTPATIENT)
Dept: ANTEPARTUM | Facility: CLINIC | Age: 24
End: 2024-08-02

## 2024-08-06 ENCOUNTER — APPOINTMENT (OUTPATIENT)
Dept: ANTEPARTUM | Facility: CLINIC | Age: 24
End: 2024-08-06

## 2024-08-06 ENCOUNTER — ASOB RESULT (OUTPATIENT)
Age: 24
End: 2024-08-06

## 2024-08-06 PROCEDURE — 76815 OB US LIMITED FETUS(S): CPT

## 2024-08-08 ENCOUNTER — APPOINTMENT (OUTPATIENT)
Dept: MATERNAL FETAL MEDICINE | Facility: CLINIC | Age: 24
End: 2024-08-08

## 2024-08-09 ENCOUNTER — APPOINTMENT (OUTPATIENT)
Dept: ANTEPARTUM | Facility: CLINIC | Age: 24
End: 2024-08-09

## 2024-08-09 PROCEDURE — 76815 OB US LIMITED FETUS(S): CPT

## 2024-08-12 ENCOUNTER — APPOINTMENT (OUTPATIENT)
Dept: MATERNAL FETAL MEDICINE | Facility: CLINIC | Age: 24
End: 2024-08-12
Payer: MEDICAID

## 2024-08-12 ENCOUNTER — ASOB RESULT (OUTPATIENT)
Age: 24
End: 2024-08-12

## 2024-08-12 PROCEDURE — 99442: CPT | Mod: 95

## 2024-08-16 ENCOUNTER — APPOINTMENT (OUTPATIENT)
Dept: ANTEPARTUM | Facility: CLINIC | Age: 24
End: 2024-08-16

## 2024-08-26 ENCOUNTER — APPOINTMENT (OUTPATIENT)
Dept: ANTEPARTUM | Facility: CLINIC | Age: 24
End: 2024-08-26
Payer: MEDICAID

## 2024-08-26 PROCEDURE — 36415 COLL VENOUS BLD VENIPUNCTURE: CPT

## 2024-08-28 LAB
AF-AFP DISCLAIMER: NORMAL
AFP  MOM: 1.06
AFP CONCENTRATION: 74.55 NG/ML
AFP INTERPRETATION: NORMAL
AFP MOM CUT-OFF: 2.5
AFP PERCENTILE: 57.6
AFP SCREENING RESULT: NORMAL
AFTER SCREENING RISK OPEN SPINA BIFIDA: NORMAL
BEFORE SCREENING RISK OPEN SPINA BIFIDA: NORMAL
CARBAMAZEPINE?: NO
CURRENT SMOKER: NORMAL
ESTIMATED DUE DATE: NORMAL
EXTREME ANALYTE ALERT: NO
FAMILY HISTORY OPEN SPINA BIFIDA: NORMAL
GESTATIONAL  AGE: NORMAL
GESTATIONAL AGE METHOD: NORMAL
INHIBIN MOM: 1.53
INHIBIN PERCENTILE: 85
INSULIN DEPEND DIABETES: NORMAL
Lab: 0.62
Lab: 0.77
Lab: 21.2
Lab: 285.5 PG/ML
Lab: 3.08 NMOL/L
Lab: 32.7
Lab: 8.07 NG/ML
Lab: NO
Lab: NORMAL
MATERNAL AGE AT EDD: 22.3
MATERNAL WGT: 89
MULTIPLE PREGNANCY STATUS: NORMAL
RACE/ETHNICITY: NORMAL
VALPROIC ACID?: NORMAL

## 2024-09-05 ENCOUNTER — NON-APPOINTMENT (OUTPATIENT)
Age: 24
End: 2024-09-05

## 2024-09-09 ENCOUNTER — APPOINTMENT (OUTPATIENT)
Dept: ANTEPARTUM | Facility: CLINIC | Age: 24
End: 2024-09-09
Payer: MEDICAID

## 2024-09-09 ENCOUNTER — ASOB RESULT (OUTPATIENT)
Age: 24
End: 2024-09-09

## 2024-09-09 PROCEDURE — 76811 OB US DETAILED SNGL FETUS: CPT

## 2024-10-28 ENCOUNTER — APPOINTMENT (OUTPATIENT)
Dept: ANTEPARTUM | Facility: CLINIC | Age: 24
End: 2024-10-28
Payer: MEDICAID

## 2024-10-28 ENCOUNTER — ASOB RESULT (OUTPATIENT)
Age: 24
End: 2024-10-28

## 2024-10-28 PROCEDURE — 76816 OB US FOLLOW-UP PER FETUS: CPT

## 2024-10-28 PROCEDURE — 76820 UMBILICAL ARTERY ECHO: CPT | Mod: 59

## 2024-11-05 ENCOUNTER — APPOINTMENT (OUTPATIENT)
Dept: ANTEPARTUM | Facility: CLINIC | Age: 24
End: 2024-11-05

## 2024-11-12 ENCOUNTER — ASOB RESULT (OUTPATIENT)
Age: 24
End: 2024-11-12

## 2024-11-12 ENCOUNTER — APPOINTMENT (OUTPATIENT)
Dept: ANTEPARTUM | Facility: CLINIC | Age: 24
End: 2024-11-12
Payer: MEDICAID

## 2024-11-12 PROCEDURE — 76819 FETAL BIOPHYS PROFIL W/O NST: CPT

## 2024-11-12 PROCEDURE — 76820 UMBILICAL ARTERY ECHO: CPT

## 2024-11-19 ENCOUNTER — APPOINTMENT (OUTPATIENT)
Dept: ANTEPARTUM | Facility: CLINIC | Age: 24
End: 2024-11-19

## 2024-11-26 ENCOUNTER — APPOINTMENT (OUTPATIENT)
Dept: ANTEPARTUM | Facility: CLINIC | Age: 24
End: 2024-11-26

## 2024-12-03 ENCOUNTER — APPOINTMENT (OUTPATIENT)
Dept: ANTEPARTUM | Facility: CLINIC | Age: 24
End: 2024-12-03
Payer: MEDICAID

## 2024-12-03 ENCOUNTER — ASOB RESULT (OUTPATIENT)
Age: 24
End: 2024-12-03

## 2024-12-03 PROCEDURE — 76819 FETAL BIOPHYS PROFIL W/O NST: CPT

## 2024-12-03 PROCEDURE — 76816 OB US FOLLOW-UP PER FETUS: CPT

## 2024-12-10 ENCOUNTER — APPOINTMENT (OUTPATIENT)
Dept: ANTEPARTUM | Facility: CLINIC | Age: 24
End: 2024-12-10

## 2024-12-17 ENCOUNTER — APPOINTMENT (OUTPATIENT)
Dept: ANTEPARTUM | Facility: CLINIC | Age: 24
End: 2024-12-17

## 2024-12-24 ENCOUNTER — ASOB RESULT (OUTPATIENT)
Age: 24
End: 2024-12-24

## 2024-12-24 ENCOUNTER — APPOINTMENT (OUTPATIENT)
Dept: ANTEPARTUM | Facility: CLINIC | Age: 24
End: 2024-12-24
Payer: MEDICAID

## 2024-12-24 PROCEDURE — 76819 FETAL BIOPHYS PROFIL W/O NST: CPT

## 2024-12-24 PROCEDURE — 76816 OB US FOLLOW-UP PER FETUS: CPT

## 2024-12-31 ENCOUNTER — APPOINTMENT (OUTPATIENT)
Dept: ANTEPARTUM | Facility: CLINIC | Age: 24
End: 2024-12-31

## 2025-01-04 ENCOUNTER — OUTPATIENT (OUTPATIENT)
Dept: OUTPATIENT SERVICES | Facility: HOSPITAL | Age: 25
LOS: 1 days | End: 2025-01-04
Payer: COMMERCIAL

## 2025-01-04 DIAGNOSIS — O26.899 OTHER SPECIFIED PREGNANCY RELATED CONDITIONS, UNSPECIFIED TRIMESTER: ICD-10-CM

## 2025-01-05 VITALS — DIASTOLIC BLOOD PRESSURE: 60 MMHG | SYSTOLIC BLOOD PRESSURE: 97 MMHG | HEART RATE: 109 BPM

## 2025-01-05 VITALS — DIASTOLIC BLOOD PRESSURE: 66 MMHG | HEART RATE: 107 BPM | SYSTOLIC BLOOD PRESSURE: 106 MMHG

## 2025-01-05 LAB
AMORPH CRY # UR COMP ASSIST: PRESENT
ANISOCYTOSIS BLD QL: SLIGHT — SIGNIFICANT CHANGE UP
APPEARANCE UR: ABNORMAL
BACTERIA # UR AUTO: ABNORMAL /HPF
BASOPHILS # BLD AUTO: 0 K/UL — SIGNIFICANT CHANGE UP (ref 0–0.2)
BASOPHILS NFR BLD AUTO: 0 % — SIGNIFICANT CHANGE UP (ref 0–2)
BILIRUB UR-MCNC: NEGATIVE — SIGNIFICANT CHANGE UP
BLD GP AB SCN SERPL QL: SIGNIFICANT CHANGE UP
COLOR SPEC: YELLOW — SIGNIFICANT CHANGE UP
DIFF PNL FLD: NEGATIVE — SIGNIFICANT CHANGE UP
EOSINOPHIL # BLD AUTO: 0 K/UL — SIGNIFICANT CHANGE UP (ref 0–0.5)
EOSINOPHIL NFR BLD AUTO: 0 % — SIGNIFICANT CHANGE UP (ref 0–6)
GIANT PLATELETS BLD QL SMEAR: PRESENT — SIGNIFICANT CHANGE UP
GLUCOSE UR QL: NEGATIVE MG/DL — SIGNIFICANT CHANGE UP
HCT VFR BLD CALC: 38.3 % — SIGNIFICANT CHANGE UP (ref 34.5–45)
HCV AB S/CO SERPL IA: 0.14 S/CO — SIGNIFICANT CHANGE UP (ref 0–0.99)
HCV AB SERPL-IMP: SIGNIFICANT CHANGE UP
HGB BLD-MCNC: 12.8 G/DL — SIGNIFICANT CHANGE UP (ref 11.5–15.5)
KETONES UR-MCNC: NEGATIVE MG/DL — SIGNIFICANT CHANGE UP
LEUKOCYTE ESTERASE UR-ACNC: ABNORMAL
LYMPHOCYTES # BLD AUTO: 0.24 K/UL — LOW (ref 1–3.3)
LYMPHOCYTES # BLD AUTO: 1.7 % — LOW (ref 13–44)
MANUAL SMEAR VERIFICATION: SIGNIFICANT CHANGE UP
MCHC RBC-ENTMCNC: 30.2 PG — SIGNIFICANT CHANGE UP (ref 27–34)
MCHC RBC-ENTMCNC: 33.4 G/DL — SIGNIFICANT CHANGE UP (ref 32–36)
MCV RBC AUTO: 90.3 FL — SIGNIFICANT CHANGE UP (ref 80–100)
MONOCYTES # BLD AUTO: 0.99 K/UL — HIGH (ref 0–0.9)
MONOCYTES NFR BLD AUTO: 7.1 % — SIGNIFICANT CHANGE UP (ref 2–14)
NEUTROPHILS # BLD AUTO: 12.51 K/UL — HIGH (ref 1.8–7.4)
NEUTROPHILS NFR BLD AUTO: 89.4 % — HIGH (ref 43–77)
NITRITE UR-MCNC: NEGATIVE — SIGNIFICANT CHANGE UP
PH UR: 8 — SIGNIFICANT CHANGE UP (ref 5–8)
PLAT MORPH BLD: NORMAL — SIGNIFICANT CHANGE UP
PLATELET # BLD AUTO: 245 K/UL — SIGNIFICANT CHANGE UP (ref 150–400)
POIKILOCYTOSIS BLD QL AUTO: SLIGHT — SIGNIFICANT CHANGE UP
POLYCHROMASIA BLD QL SMEAR: SLIGHT — SIGNIFICANT CHANGE UP
PROT UR-MCNC: NEGATIVE MG/DL — SIGNIFICANT CHANGE UP
RBC # BLD: 4.24 M/UL — SIGNIFICANT CHANGE UP (ref 3.8–5.2)
RBC # FLD: 13.3 % — SIGNIFICANT CHANGE UP (ref 10.3–14.5)
RBC BLD AUTO: ABNORMAL
RBC CASTS # UR COMP ASSIST: 0 /HPF — SIGNIFICANT CHANGE UP (ref 0–4)
SP GR SPEC: 1.01 — SIGNIFICANT CHANGE UP (ref 1–1.03)
SQUAMOUS # UR AUTO: 17 /HPF — HIGH (ref 0–5)
UROBILINOGEN FLD QL: 0.2 MG/DL — SIGNIFICANT CHANGE UP (ref 0.2–1)
VARIANT LYMPHS # BLD: 1.8 % — SIGNIFICANT CHANGE UP (ref 0–6)
VARIANT LYMPHS NFR BLD MANUAL: 1.8 % — SIGNIFICANT CHANGE UP (ref 0–6)
WBC # BLD: 13.99 K/UL — HIGH (ref 3.8–10.5)
WBC # FLD AUTO: 13.99 K/UL — HIGH (ref 3.8–10.5)
WBC UR QL: 14 /HPF — HIGH (ref 0–5)

## 2025-01-05 PROCEDURE — 86900 BLOOD TYPING SEROLOGIC ABO: CPT

## 2025-01-05 PROCEDURE — 86803 HEPATITIS C AB TEST: CPT

## 2025-01-05 PROCEDURE — 59025 FETAL NON-STRESS TEST: CPT

## 2025-01-05 PROCEDURE — 86850 RBC ANTIBODY SCREEN: CPT

## 2025-01-05 PROCEDURE — 85025 COMPLETE CBC W/AUTO DIFF WBC: CPT

## 2025-01-05 PROCEDURE — G0463: CPT

## 2025-01-05 PROCEDURE — 86901 BLOOD TYPING SEROLOGIC RH(D): CPT

## 2025-01-05 PROCEDURE — 81001 URINALYSIS AUTO W/SCOPE: CPT

## 2025-01-05 PROCEDURE — 87086 URINE CULTURE/COLONY COUNT: CPT

## 2025-01-05 PROCEDURE — 36415 COLL VENOUS BLD VENIPUNCTURE: CPT

## 2025-01-05 RX ORDER — SODIUM CHLORIDE 9 G/1000ML
1000 INJECTION, SOLUTION INTRAVENOUS ONCE
Refills: 0 | Status: DISCONTINUED | OUTPATIENT
Start: 2025-01-05 | End: 2025-01-19

## 2025-01-06 LAB
CULTURE RESULTS: SIGNIFICANT CHANGE UP
SPECIMEN SOURCE: SIGNIFICANT CHANGE UP

## 2025-01-07 ENCOUNTER — TRANSCRIPTION ENCOUNTER (OUTPATIENT)
Age: 25
End: 2025-01-07

## 2025-01-07 ENCOUNTER — INPATIENT (INPATIENT)
Facility: HOSPITAL | Age: 25
LOS: 1 days | Discharge: HOME | DRG: 761 | End: 2025-01-09
Attending: STUDENT IN AN ORGANIZED HEALTH CARE EDUCATION/TRAINING PROGRAM | Admitting: STUDENT IN AN ORGANIZED HEALTH CARE EDUCATION/TRAINING PROGRAM
Payer: COMMERCIAL

## 2025-01-07 ENCOUNTER — APPOINTMENT (OUTPATIENT)
Dept: ANTEPARTUM | Facility: CLINIC | Age: 25
End: 2025-01-07

## 2025-01-07 VITALS
TEMPERATURE: 98 F | HEART RATE: 90 BPM | RESPIRATION RATE: 16 BRPM | SYSTOLIC BLOOD PRESSURE: 104 MMHG | DIASTOLIC BLOOD PRESSURE: 66 MMHG

## 2025-01-07 DIAGNOSIS — R35.0 FREQUENCY OF MICTURITION: ICD-10-CM

## 2025-01-07 DIAGNOSIS — O26.893 OTHER SPECIFIED PREGNANCY RELATED CONDITIONS, THIRD TRIMESTER: ICD-10-CM

## 2025-01-07 DIAGNOSIS — Z3A.37 37 WEEKS GESTATION OF PREGNANCY: ICD-10-CM

## 2025-01-07 DIAGNOSIS — N88.3 INCOMPETENCE OF CERVIX UTERI: ICD-10-CM

## 2025-01-07 DIAGNOSIS — O47.1 FALSE LABOR AT OR AFTER 37 COMPLETED WEEKS OF GESTATION: ICD-10-CM

## 2025-01-07 LAB
BASOPHILS # BLD AUTO: 0.05 K/UL — SIGNIFICANT CHANGE UP (ref 0–0.2)
BASOPHILS NFR BLD AUTO: 0.6 % — SIGNIFICANT CHANGE UP (ref 0–2)
BLD GP AB SCN SERPL QL: SIGNIFICANT CHANGE UP
EOSINOPHIL # BLD AUTO: 0.01 K/UL — SIGNIFICANT CHANGE UP (ref 0–0.5)
EOSINOPHIL NFR BLD AUTO: 0.1 % — SIGNIFICANT CHANGE UP (ref 0–6)
HCT VFR BLD CALC: 41.2 % — SIGNIFICANT CHANGE UP (ref 34.5–45)
HGB BLD-MCNC: 14 G/DL — SIGNIFICANT CHANGE UP (ref 11.5–15.5)
IMM GRANULOCYTES NFR BLD AUTO: 1 % — HIGH (ref 0–0.9)
LYMPHOCYTES # BLD AUTO: 1.26 K/UL — SIGNIFICANT CHANGE UP (ref 1–3.3)
LYMPHOCYTES # BLD AUTO: 16.3 % — SIGNIFICANT CHANGE UP (ref 13–44)
MCHC RBC-ENTMCNC: 30.8 PG — SIGNIFICANT CHANGE UP (ref 27–34)
MCHC RBC-ENTMCNC: 34 G/DL — SIGNIFICANT CHANGE UP (ref 32–36)
MCV RBC AUTO: 90.5 FL — SIGNIFICANT CHANGE UP (ref 80–100)
MONOCYTES # BLD AUTO: 0.39 K/UL — SIGNIFICANT CHANGE UP (ref 0–0.9)
MONOCYTES NFR BLD AUTO: 5 % — SIGNIFICANT CHANGE UP (ref 2–14)
NEUTROPHILS # BLD AUTO: 5.95 K/UL — SIGNIFICANT CHANGE UP (ref 1.8–7.4)
NEUTROPHILS NFR BLD AUTO: 77 % — SIGNIFICANT CHANGE UP (ref 43–77)
PLATELET # BLD AUTO: 274 K/UL — SIGNIFICANT CHANGE UP (ref 150–400)
RBC # BLD: 4.55 M/UL — SIGNIFICANT CHANGE UP (ref 3.8–5.2)
RBC # FLD: 13.8 % — SIGNIFICANT CHANGE UP (ref 10.3–14.5)
WBC # BLD: 7.74 K/UL — SIGNIFICANT CHANGE UP (ref 3.8–10.5)
WBC # FLD AUTO: 7.74 K/UL — SIGNIFICANT CHANGE UP (ref 3.8–10.5)

## 2025-01-07 DEVICE — SURGICEL 2 X 14": Type: IMPLANTABLE DEVICE | Status: FUNCTIONAL

## 2025-01-07 RX ORDER — ACETAMINOPHEN 80 MG/.8ML
975 SOLUTION/ DROPS ORAL ONCE
Refills: 0 | Status: COMPLETED | OUTPATIENT
Start: 2025-01-07 | End: 2025-01-07

## 2025-01-07 RX ORDER — MAGNESIUM HYDROXIDE 400 MG/5ML
30 SUSPENSION, ORAL (FINAL DOSE FORM) ORAL
Refills: 0 | Status: DISCONTINUED | OUTPATIENT
Start: 2025-01-07 | End: 2025-01-09

## 2025-01-07 RX ORDER — CEFAZOLIN SODIUM 1 G
2000 VIAL (EA) INJECTION ONCE
Refills: 0 | Status: COMPLETED | OUTPATIENT
Start: 2025-01-07 | End: 2025-01-07

## 2025-01-07 RX ORDER — SODIUM CHLORIDE 9 MG/ML
1000 INJECTION, SOLUTION INTRAVENOUS
Refills: 0 | Status: DISCONTINUED | OUTPATIENT
Start: 2025-01-07 | End: 2025-01-08

## 2025-01-07 RX ORDER — OXYTOCIN IN 5 % DEXTROSE 20/500ML
42 PLASTIC BAG, INJECTION (ML) INTRAVENOUS
Qty: 30 | Refills: 0 | Status: DISCONTINUED | OUTPATIENT
Start: 2025-01-07 | End: 2025-01-09

## 2025-01-07 RX ORDER — CLOSTRIDIUM TETANI TOXOID ANTIGEN (FORMALDEHYDE INACTIVATED), CORYNEBACTERIUM DIPHTHERIAE TOXOID ANTIGEN (FORMALDEHYDE INACTIVATED), BORDETELLA PERTUSSIS TOXOID ANTIGEN (GLUTARALDEHYDE INACTIVATED), BORDETELLA PERTUSSIS FILAMENTOUS HEMAGGLUTININ ANTIGEN (FORMALDEHYDE INACTIVATED), BORDETELLA PERTUSSIS PERTACTIN ANTIGEN, AND BORDETELLA PERTUSSIS FIMBRIAE 2/3 ANTIGEN 5; 2; 2.5; 5; 3; 5 [LF]/.5ML; [LF]/.5ML; UG/.5ML; UG/.5ML; UG/.5ML; UG/.5ML
0.5 INJECTION, SUSPENSION INTRAMUSCULAR ONCE
Refills: 0 | Status: DISCONTINUED | OUTPATIENT
Start: 2025-01-07 | End: 2025-01-09

## 2025-01-07 RX ORDER — LANOLIN
1 OINTMENT (GRAM) TOPICAL EVERY 6 HOURS
Refills: 0 | Status: DISCONTINUED | OUTPATIENT
Start: 2025-01-07 | End: 2025-01-09

## 2025-01-07 RX ORDER — SCOPOLAMINE 1.5 MG/1
1 PATCH, EXTENDED RELEASE TRANSDERMAL ONCE
Refills: 0 | Status: COMPLETED | OUTPATIENT
Start: 2025-01-07 | End: 2025-01-07

## 2025-01-07 RX ORDER — KETOROLAC TROMETHAMINE 30 MG/ML
30 INJECTION INTRAMUSCULAR; INTRAVENOUS EVERY 6 HOURS
Refills: 0 | Status: DISCONTINUED | OUTPATIENT
Start: 2025-01-07 | End: 2025-01-08

## 2025-01-07 RX ORDER — OXYCODONE HCL 15 MG
5 TABLET ORAL
Refills: 0 | Status: DISCONTINUED | OUTPATIENT
Start: 2025-01-07 | End: 2025-01-09

## 2025-01-07 RX ORDER — SIMETHICONE 125 MG/1
80 CAPSULE, LIQUID FILLED ORAL EVERY 4 HOURS
Refills: 0 | Status: DISCONTINUED | OUTPATIENT
Start: 2025-01-07 | End: 2025-01-09

## 2025-01-07 RX ORDER — SODIUM CHLORIDE 9 MG/ML
1000 INJECTION, SOLUTION INTRAVENOUS
Refills: 0 | Status: DISCONTINUED | OUTPATIENT
Start: 2025-01-07 | End: 2025-01-09

## 2025-01-07 RX ORDER — CITRIC ACID/SODIUM CITRATE 334-500MG
30 SOLUTION, ORAL ORAL ONCE
Refills: 0 | Status: COMPLETED | OUTPATIENT
Start: 2025-01-07 | End: 2025-01-07

## 2025-01-07 RX ORDER — FAMOTIDINE 20 MG/1
20 TABLET, FILM COATED ORAL ONCE
Refills: 0 | Status: COMPLETED | OUTPATIENT
Start: 2025-01-07 | End: 2025-01-07

## 2025-01-07 RX ORDER — DIPHENHYDRAMINE HCL 25 MG
25 TABLET ORAL EVERY 6 HOURS
Refills: 0 | Status: DISCONTINUED | OUTPATIENT
Start: 2025-01-07 | End: 2025-01-09

## 2025-01-07 RX ORDER — ACETAMINOPHEN 80 MG/.8ML
975 SOLUTION/ DROPS ORAL
Refills: 0 | Status: DISCONTINUED | OUTPATIENT
Start: 2025-01-07 | End: 2025-01-09

## 2025-01-07 RX ORDER — CHLORHEXIDINE GLUCONATE 1.2 MG/ML
1 RINSE ORAL DAILY
Refills: 0 | Status: DISCONTINUED | OUTPATIENT
Start: 2025-01-07 | End: 2025-01-08

## 2025-01-07 RX ORDER — IBUPROFEN 200 MG
600 TABLET ORAL EVERY 6 HOURS
Refills: 0 | Status: COMPLETED | OUTPATIENT
Start: 2025-01-07 | End: 2025-12-06

## 2025-01-07 RX ORDER — OXYCODONE HCL 15 MG
5 TABLET ORAL ONCE
Refills: 0 | Status: DISCONTINUED | OUTPATIENT
Start: 2025-01-07 | End: 2025-01-09

## 2025-01-07 RX ORDER — CEFAZOLIN SODIUM 1 G
2000 VIAL (EA) INJECTION ONCE
Refills: 0 | Status: DISCONTINUED | OUTPATIENT
Start: 2025-01-07 | End: 2025-01-07

## 2025-01-07 RX ADMIN — SCOPOLAMINE 1 PATCH: 1.5 PATCH, EXTENDED RELEASE TRANSDERMAL at 17:41

## 2025-01-07 RX ADMIN — Medication 30 MILLILITER(S): at 21:38

## 2025-01-07 RX ADMIN — FAMOTIDINE 20 MILLIGRAM(S): 20 TABLET, FILM COATED ORAL at 17:41

## 2025-01-07 RX ADMIN — ACETAMINOPHEN 975 MILLIGRAM(S): 80 SOLUTION/ DROPS ORAL at 21:30

## 2025-01-07 RX ADMIN — Medication 0.25 MILLIGRAM(S): at 17:51

## 2025-01-07 RX ADMIN — CHLORHEXIDINE GLUCONATE 1 APPLICATION(S): 1.2 RINSE ORAL at 17:35

## 2025-01-07 RX ADMIN — ACETAMINOPHEN 975 MILLIGRAM(S): 80 SOLUTION/ DROPS ORAL at 17:46

## 2025-01-07 RX ADMIN — SCOPOLAMINE 1 PATCH: 1.5 PATCH, EXTENDED RELEASE TRANSDERMAL at 19:23

## 2025-01-07 RX ADMIN — KETOROLAC TROMETHAMINE 30 MILLIGRAM(S): 30 INJECTION INTRAMUSCULAR; INTRAVENOUS at 22:11

## 2025-01-07 RX ADMIN — Medication 2000 MILLIGRAM(S): at 22:01

## 2025-01-07 RX ADMIN — Medication 42 MILLIUNIT(S)/MIN: at 23:04

## 2025-01-07 RX ADMIN — SODIUM CHLORIDE 200 MILLILITER(S): 9 INJECTION, SOLUTION INTRAVENOUS at 18:08

## 2025-01-07 RX ADMIN — SODIUM CHLORIDE 200 MILLILITER(S): 9 INJECTION, SOLUTION INTRAVENOUS at 17:34

## 2025-01-07 RX ADMIN — Medication 30 MILLILITER(S): at 21:30

## 2025-01-07 RX ADMIN — FAMOTIDINE 20 MILLIGRAM(S): 20 TABLET, FILM COATED ORAL at 21:30

## 2025-01-07 NOTE — OB RN INTRAOPERATIVE NOTE - NSOBSELHIDDEN_OBGYN_ALL_OB_FT
[NSOBAttendingProcedure1_OBGYN_ALL_OB_FT:[id number]],[NSRNCirculatorProcedure1_OBGYN_ALL_OB_FT:[id number]=]

## 2025-01-07 NOTE — OB RN DELIVERY SUMMARY - NSSELHIDDEN_OBGYN_ALL_OB_FT
[NS_DeliveryAttending1_OBGYN_ALL_OB_FT:[id number]],[NS_DeliveryAssist1_OBGYN_ALL_OB_FT:[id number]],[NS_DeliveryRN_OBGYN_ALL_OB_FT:[id number]=]

## 2025-01-07 NOTE — OB PROVIDER H&P - NS_LMP_OBGYN_ALL_OB_DT
----- Message from KARYNA Cleaning sent at 7/2/2024  7:37 PM CDT -----  Kidneys and liver function at goal  Thyroid balanced  Cholesterol is excellent, HDL and LDL very good ratio   15-Apr-2024

## 2025-01-07 NOTE — OB RN DELIVERY SUMMARY - NS_SEPSISRSKCALC_OBGYN_ALL_OB_FT
EOS calculated successfully. EOS Risk Factor: 0.5/1000 live births ([facility] national incidence); GA=38w1d; Temp=98.9; ROM=0.017; GBS='Negative'; Antibiotics='No antibiotics or any antibiotics < 2 hrs prior to birth'  
no

## 2025-01-07 NOTE — OB PROVIDER H&P - HISTORY OF PRESENT ILLNESS
MANPREET MALLOY is a 25yo  at 38w1d with breech presentation fetus who presents for external cephalic version. If unsuccessful, pt for primary  section.      Denies leakage of fluids, vaginal bleeding. Reports intermittent contractions. Reports active fetal movement.   Denies other symptoms.  [facility]  Pregnancy c/b insufficient PNC  PPBC: desires depo    OBhx:  () uncomplicated  Gyn: denies hx of fibroids, cysts, or polyps; denies STIs  PMH: denies  PSH: denies  Med: PNV  Allergies: NKDA   MANPREET MALLOY is a 25yo  at 38w1d with breech presentation fetus who presents for external cephalic version. If unsuccessful, pt for primary  section @39w to be scheduled.      Denies leakage of fluids, vaginal bleeding. Reports intermittent contractions. Reports active fetal movement.   Denies other symptoms.  [facility]  Pregnancy c/b insufficient PNC  PPBC: desires depo    OBhx:  () uncomplicated  Gyn: denies hx of fibroids, cysts, or polyps; denies STIs  PMH: denies  PSH: denies  Med: PNV  Allergies: NKDA

## 2025-01-07 NOTE — DISCHARGE NOTE PROVIDER - CARE PROVIDER_API CALL
Aline Haider  Obstetrics and Gynecology  [facility] [place] [address]  Phone: [phone]  Fax: [phone]  Follow Up Time:

## 2025-01-07 NOTE — OB PROVIDER DELIVERY SUMMARY - NSPROVIDERDELIVERYNOTE_OBGYN_ALL_OB_FT
Procedure: Primary  Delivery, secondary to failed ECV with patient proceeding to labor.     Findings: Viable female infant delivered in Breech presentation at 2209, placenta delivered at 2210.  Apgar scores 9/9  Weight: 2540g  EBL: 171ml  UOP: 30  IVF: 400ML  Complications: small superficial laceration over infant's buttocks    Patient was taken to the OR, a primary low transverse  section was performed and a viable female infant was delivered atraumatically in breech presentation at 2209. Placenta delivered at 2210. Hysterotomy, rectus muscles, fascia, and skin were reapproximated with good hemostasis achieved at each layer of closure. Mother stable postprocedure, taken to the recovery room with infant. Procedure: Primary  Delivery in the setting of malpresentation in labor   Findings: Viable female infant delivered in Breech presentation at 2209, placenta delivered at 2210.  Apgar scores 9/9  Weight: 2540g  EBL: 171ml  UOP: 30  IVF: 400ML  Complications: small superficial laceration over infant's left buttocks    Patient was taken to the OR, a primary low transverse  section was performed and a viable female infant was delivered atraumatically in breech presentation at 2209. Placenta delivered at 2210. Hysterotomy, fascia, and skin were reapproximated with good hemostasis achieved at each layer of closure. Surgicel placed on left side of hysterotomy. Mother stable postprocedure, taken to the recovery room with infant. Procedure: Primary  Delivery in the setting of malpresentation in labor   Findings: Viable female infant delivered in Breech presentation at 2209, placenta delivered at 2210.  Apgar scores 9/9  Weight: 2540g  EBL: 171ml  UOP: 30  IVF: 400ML  Complications: small superficial laceration over infant's left buttocks    Patient was taken to the OR, a primary low transverse  section was performed and a viable female infant was delivered atraumatically in breech presentation at 2209. Placenta delivered at 2210. Hysterotomy, fascia, and skin were reapproximated with good hemostasis achieved at each layer of closure. Surgicel placed on left side of hysterotomy. Mother stable postprocedure, taken to the recovery room with infant.    Dictation number: [id number]

## 2025-01-07 NOTE — OB RN INTRAOPERATIVE NOTE - NSSELHIDDEN_OBGYN_ALL_OB_FT
[NS_DeliveryAttending1_OBGYN_ALL_OB_FT:[id number]],[NS_DeliveryAssist1_OBGYN_ALL_OB_FT:[id number]],[NS_DeliveryRN_OBGYN_ALL_OB_FT:[id number]=]

## 2025-01-07 NOTE — OB NEONATOLOGY/PEDIATRICIAN DELIVERY SUMMARY - NSPEDSNEONOTESA_OBGYN_ALL_OB_FT
OB / Dr. Haider requested me to attend P  due to breech presentation. The mother is 25 y/o, , OO+, HIV, RPR, HBsAg, Hep-C, GBS are NR, RI  L & D: External version was unsuccessful, clear fluid, delivered by breech, delayed cord clamping done, bulb suctioned and dried. OB mentioned about laceration on left buttock during incision  of uterus by scalpel OB / Dr. Haider requested me to attend P  due to breech presentation. The mother is 25 y/o, , OO+, HIV, RPR, HBsAg, Hep-C, GBS are NR, RI  L & D: External version was unsuccessful, clear fluid, delivered by breech, delayed cord clamping done, bulb suctioned and dried. OB mentioned about laceration on left buttock during incision  of uterus by scalpel. The wound was examined, ~2cm long, skin deep in the center, otherwise superficial with oozing. Assessment: full term appropriate for gestational age, BG, P  due to breech, buttock laceration as birth injury. The wound was cleaned, and closed with Steri-strips. OB attending updated the mother.  Plan: observe in transition, if remain stable, then admit to NBN. Ped Hospitalist was informed and advised to reassess in NBN, called plastic if needed

## 2025-01-07 NOTE — OB RN PATIENT PROFILE - INFANT HOME WITH MOTHER, OB PROFILE

## 2025-01-07 NOTE — OB RN PATIENT PROFILE - NSSDOHLAW_OBGYN_A_OB
Quality 130: Documentation Of Current Medications In The Medical Record: Current Medications Documented Detail Level: Detailed Quality 110: Preventive Care And Screening: Influenza Immunization: Influenza Immunization previously received during influenza season I do not need any legal help

## 2025-01-07 NOTE — OB PROVIDER H&P - NSLOWPPHRISK_OBGYN_A_OB
No previous uterine incision/Valadez Pregnancy/Less than or equal to 4 previous vaginal births/No known bleeding disorder/No history of postpartum hemorrhage

## 2025-01-07 NOTE — DISCHARGE NOTE PROVIDER - NSDCFUADDINST_GEN_ALL_CORE_FT
Labor precautions reviewed. Pt to continue with her routine prenatal care. She will be scheduled for primary  section @39 weeks gestation.

## 2025-01-07 NOTE — DISCHARGE NOTE PROVIDER - HOSPITAL COURSE
Pt presented for an ECV, for which she received an epidural and terbutaline to increase chances of success. However, the fetus did not tolerate the procedure, and it was unsuccessful. She will be scheduled for a primary  @39 weeks gestation. At time of discharge, FHT is reactive and maternal VSS.

## 2025-01-07 NOTE — OB RN INTRAOPERATIVE NOTE - NS_IMPLANTS_SURGICEL HEMOSTAT EXPIRATION DATE_OBGYN_ALL_OB_DT
11/3 - 2g IV magnesium given today. Recheck level. Replete prn.  11/1 - resolved with supplementation.  Will continue to monitor and replete prn  -receiving PO supplementation. Supplementing PRN with IV     31-May-2026

## 2025-01-07 NOTE — DISCHARGE NOTE PROVIDER - NSDCCPCAREPLAN_GEN_ALL_CORE_FT
PRINCIPAL DISCHARGE DIAGNOSIS  Diagnosis: Breech presentation  Assessment and Plan of Treatment:

## 2025-01-07 NOTE — OB PROVIDER H&P - ASSESSMENT
A/P: MANPREET MALLOY is a 23yo  at 38w1d with breech presentation fetus who presents for external cephalic version. If unsuccessful, pt for primary  section.      Admission labs  Consent  [place] regularly  FHT category I  Plan for terbutaline 0.25mg then ECV   spinal anesthesia   NPO, IVF    D/w Dr Haider     A/P: MANPREET MALLOY is a 25yo  at 38w1d with breech presentation fetus who presents for external cephalic version. If unsuccessful, pt for primary  section@39w to be scheduled.      Admission labs  Consent  [place] regularly  FHT category I  Plan for terbutaline 0.25mg then ECV   Epidural anesthesia   NPO, IVF    D/w Dr Haider

## 2025-01-07 NOTE — CHART NOTE - NSCHARTNOTEFT_GEN_A_CORE
25yo  at 38w1d with breech presentation admitted for ECV. Counseled on ECV followed by scheduled primary  section at 39w GA if unsuccessful. The risks/benefits were reviewed with the pt, and she desired to attempt ECV. Bedside us performed, breech confirmed. Terbutaline 0.25mg was administered and then epidural anesthesia was placed and found to be adequate. Fetal breech was gently disengaged from pelvis and a counterclockwise roll maneuver was attempted. Due to lack of successful movement, this was followed by a clockwise roll maneuver, which was ultimately discontinued due to fetal heart rate deceleration. The fetal heart rate recovered to 120s-130s bpm. Inability to guide the fetal head towards the pelvis successfully despite multiple attempts resulted in discontinuation of procedure. FHR was reassuring following conclusion of procedure. The patient was taken to the recovery room for NST.

## 2025-01-07 NOTE — OB PROVIDER H&P - NSHPPHYSICALEXAM_GEN_ALL_CORE
Vitals:   T(C): 36.7 (01-07-25 @ 16:08), Max: 36.7 (01-07-25 @ 15:14)  T(F): 98.1 (01-07-25 @ 16:08), Max: 98.1 (01-07-25 @ 16:08)  HR: 90 (01-07-25 @ 16:08) (90 - 90)  BP: 104/66 (01-07-25 @ 16:08) (104/66 - 104/66)  RR: 16 (01-07-25 @ 16:08) (16 - 16)  SpO2: --    General: AAOx3, NAD  Abd: Soft, nontender, gravid    BMI:   BMI (kg/m2): 21.9 (01-07-25)    FHT: baseline at 135bpm, mod enedina, + accel, - decel  [place]: q4m

## 2025-01-07 NOTE — CHART NOTE - NSCHARTNOTEFT_GEN_A_CORE
Patient re-exmained at bedside after ECV. Reports feeling contractions. SVE now 3.5/50/-3. Prior exam was 1cm just prior to ECV. Informed patient of cervical change and the recommendation to proceed with CS in the setting of labor with breech presentation. Patient agrees with plan. Will prepare for OR. Patient re-exmained at bedside after ECV. Reports feeling contractions. SVE now 3.5/50/-3. Prior exam was 1cm just prior to ECV. Informed patient of cervical change and the recommendation to proceed with CS in the setting of labor with breech presentation. Patient agrees with plan. Will prepare for OR.    Pt is s/p unsuccessful ECV. Pt with contractions every 6min. SVE changed from 1 cm to 3.5cm. Pt was counselled on the R/B/A to a primary CS including, but not limited to, bleeding, infection, injury to surrounding structures, postoperative pain and possible  hysterectomy. Pt is amenable to a blood transfusion in the event of an emergency.  All questions were answered to patient's satisfaction. Pt verbalized understanding and agreement with plan. Consent signed at bedside with RN.

## 2025-01-07 NOTE — OB PROVIDER H&P - ATTENDING COMMENTS
MANPREET MALLOY is a 25yo  at 38w1d GA who is being admitted for external cephalic version. PNC is significant for: insufficient PNC and breech presentation. Pt was counselled on R/B/A to ECV including, but not limited to failed version, PROM and possible need for urgent CS.  Pt is amenable to a blood transfusion in the event of an emergency. Terbutaline and regional anesthesia to be given prior. All questions were answered to patient's satisfaction. Pt verbalized understanding and agreement with plan. Consent signed at bedside with RN.

## 2025-01-07 NOTE — OB PROVIDER DELIVERY SUMMARY - NSSELHIDDEN_OBGYN_ALL_OB_FT
[NS_DeliveryAttending1_OBGYN_ALL_OB_FT:[id number]],[NS_DeliveryAssist1_OBGYN_ALL_OB_FT:[id number]]

## 2025-01-07 NOTE — OB RN PATIENT PROFILE - NS_GBS_INFANT_INVASIVE_OBGYN_ALL_OB_FT
Lives at home with his wife. Ambulates independently and can perform all ADLs.   Tobacco: never  Etoh: in moderation No N/A

## 2025-01-08 ENCOUNTER — TRANSCRIPTION ENCOUNTER (OUTPATIENT)
Age: 25
End: 2025-01-08

## 2025-01-08 LAB
BASOPHILS # BLD AUTO: 0 K/UL — SIGNIFICANT CHANGE UP (ref 0–0.2)
BASOPHILS NFR BLD AUTO: 0 % — SIGNIFICANT CHANGE UP (ref 0–2)
EOSINOPHIL # BLD AUTO: 0 K/UL — SIGNIFICANT CHANGE UP (ref 0–0.5)
EOSINOPHIL NFR BLD AUTO: 0 % — SIGNIFICANT CHANGE UP (ref 0–6)
GIANT PLATELETS BLD QL SMEAR: PRESENT — SIGNIFICANT CHANGE UP
HCT VFR BLD CALC: 39 % — SIGNIFICANT CHANGE UP (ref 34.5–45)
HGB BLD-MCNC: 13.1 G/DL — SIGNIFICANT CHANGE UP (ref 11.5–15.5)
LYMPHOCYTES # BLD AUTO: 0.96 K/UL — LOW (ref 1–3.3)
LYMPHOCYTES # BLD AUTO: 6.1 % — LOW (ref 13–44)
MANUAL SMEAR VERIFICATION: SIGNIFICANT CHANGE UP
MCHC RBC-ENTMCNC: 30.3 PG — SIGNIFICANT CHANGE UP (ref 27–34)
MCHC RBC-ENTMCNC: 33.6 G/DL — SIGNIFICANT CHANGE UP (ref 32–36)
MCV RBC AUTO: 90.3 FL — SIGNIFICANT CHANGE UP (ref 80–100)
MONOCYTES # BLD AUTO: 1.11 K/UL — HIGH (ref 0–0.9)
MONOCYTES NFR BLD AUTO: 7 % — SIGNIFICANT CHANGE UP (ref 2–14)
NEUTROPHILS # BLD AUTO: 13.45 K/UL — HIGH (ref 1.8–7.4)
NEUTROPHILS NFR BLD AUTO: 81.7 % — HIGH (ref 43–77)
NEUTS BAND # BLD: 3.5 % — SIGNIFICANT CHANGE UP (ref 0–8)
PLAT MORPH BLD: NORMAL — SIGNIFICANT CHANGE UP
PLATELET # BLD AUTO: 276 K/UL — SIGNIFICANT CHANGE UP (ref 150–400)
RBC # BLD: 4.32 M/UL — SIGNIFICANT CHANGE UP (ref 3.8–5.2)
RBC # FLD: 13.6 % — SIGNIFICANT CHANGE UP (ref 10.3–14.5)
RBC BLD AUTO: NORMAL — SIGNIFICANT CHANGE UP
T PALLIDUM AB TITR SER: NEGATIVE — SIGNIFICANT CHANGE UP
VARIANT LYMPHS # BLD: 1.7 % — SIGNIFICANT CHANGE UP (ref 0–6)
WBC # BLD: 15.79 K/UL — HIGH (ref 3.8–10.5)
WBC # FLD AUTO: 15.79 K/UL — HIGH (ref 3.8–10.5)

## 2025-01-08 RX ORDER — IBUPROFEN 200 MG
600 TABLET ORAL EVERY 6 HOURS
Refills: 0 | Status: DISCONTINUED | OUTPATIENT
Start: 2025-01-08 | End: 2025-01-09

## 2025-01-08 RX ORDER — ENOXAPARIN SODIUM 60 MG/.6ML
40 INJECTION INTRAVENOUS; SUBCUTANEOUS EVERY 24 HOURS
Refills: 0 | Status: DISCONTINUED | OUTPATIENT
Start: 2025-01-08 | End: 2025-01-09

## 2025-01-08 RX ORDER — ACETAMINOPHEN 80 MG/.8ML
3 SOLUTION/ DROPS ORAL
Qty: 84 | Refills: 0
Start: 2025-01-08 | End: 2025-01-14

## 2025-01-08 RX ORDER — INFLUENZA A VIRUS A/WISCONSIN/588/2019 (H1N1) RECOMBINANT HEMAGGLUTININ ANTIGEN, INFLUENZA A VIRUS A/DARWIN/6/2021 (H3N2) RECOMBINANT HEMAGGLUTININ ANTIGEN, INFLUENZA B VIRUS B/AUSTRIA/1359417/2021 RECOMBINANT HEMAGGLUTININ ANTIGEN, AND INFLUENZA B VIRUS B/PHUKET/3073/2013 RECOMBINANT HEMAGGLUTININ ANTIGEN 45; 45; 45; 45 UG/.5ML; UG/.5ML; UG/.5ML; UG/.5ML
0.5 INJECTION INTRAMUSCULAR ONCE
Refills: 0 | Status: COMPLETED | OUTPATIENT
Start: 2025-01-08 | End: 2025-01-08

## 2025-01-08 RX ORDER — IBUPROFEN 200 MG
1 TABLET ORAL
Qty: 28 | Refills: 0
Start: 2025-01-08 | End: 2025-01-14

## 2025-01-08 RX ADMIN — KETOROLAC TROMETHAMINE 30 MILLIGRAM(S): 30 INJECTION INTRAMUSCULAR; INTRAVENOUS at 11:26

## 2025-01-08 RX ADMIN — ACETAMINOPHEN 975 MILLIGRAM(S): 80 SOLUTION/ DROPS ORAL at 09:05

## 2025-01-08 RX ADMIN — KETOROLAC TROMETHAMINE 30 MILLIGRAM(S): 30 INJECTION INTRAMUSCULAR; INTRAVENOUS at 17:32

## 2025-01-08 RX ADMIN — KETOROLAC TROMETHAMINE 30 MILLIGRAM(S): 30 INJECTION INTRAMUSCULAR; INTRAVENOUS at 05:13

## 2025-01-08 RX ADMIN — ACETAMINOPHEN 975 MILLIGRAM(S): 80 SOLUTION/ DROPS ORAL at 20:26

## 2025-01-08 RX ADMIN — SCOPOLAMINE 1 PATCH: 1.5 PATCH, EXTENDED RELEASE TRANSDERMAL at 07:56

## 2025-01-08 RX ADMIN — ENOXAPARIN SODIUM 40 MILLIGRAM(S): 60 INJECTION INTRAVENOUS; SUBCUTANEOUS at 11:28

## 2025-01-08 RX ADMIN — ACETAMINOPHEN 975 MILLIGRAM(S): 80 SOLUTION/ DROPS ORAL at 02:07

## 2025-01-08 RX ADMIN — Medication 600 MILLIGRAM(S): at 23:21

## 2025-01-08 RX ADMIN — ACETAMINOPHEN 975 MILLIGRAM(S): 80 SOLUTION/ DROPS ORAL at 14:50

## 2025-01-08 RX ADMIN — INFLUENZA A VIRUS A/WISCONSIN/588/2019 (H1N1) RECOMBINANT HEMAGGLUTININ ANTIGEN, INFLUENZA A VIRUS A/DARWIN/6/2021 (H3N2) RECOMBINANT HEMAGGLUTININ ANTIGEN, INFLUENZA B VIRUS B/AUSTRIA/1359417/2021 RECOMBINANT HEMAGGLUTININ ANTIGEN, AND INFLUENZA B VIRUS B/PHUKET/3073/2013 RECOMBINANT HEMAGGLUTININ ANTIGEN 0.5 MILLILITER(S): 45; 45; 45; 45 INJECTION INTRAMUSCULAR at 20:26

## 2025-01-08 RX ADMIN — SODIUM CHLORIDE 125 MILLILITER(S): 9 INJECTION, SOLUTION INTRAVENOUS at 02:07

## 2025-01-08 NOTE — DISCHARGE NOTE OB - CARE PROVIDER_API CALL
Aline Haider  Obstetrics and Gynecology  [facility] [place] [address]  Phone: [phone]  Fax: [phone]  Follow Up Time:

## 2025-01-08 NOTE — PROGRESS NOTE ADULT - ATTENDING COMMENTS
POD1  no complaints   Hb 13.1  abd soft, uterus firm, wound intact  ext no edema   breast feeding  plan   routine post op care  dvt ppx

## 2025-01-08 NOTE — DISCHARGE NOTE OB - MATERIALS PROVIDED
Vaccinations/[facility]  Screening Program/  Immunization Record/Breastfeeding Log/Breastfeeding Mother’s Support Group Information/Guide to Postpartum Care/[facility] Hearing Screen Program/Back To Sleep Handout/Shaken Baby Prevention Handout/Breastfeeding Guide and Packet/Discharge Medication Information for Patients and Families Pocket Guide

## 2025-01-08 NOTE — DISCHARGE NOTE OB - CARE PLAN
Principal Discharge DX:	S/P primary low transverse   Assessment and plan of treatment:	Patient had  section. Hospital course was uncomplicated. Patient should plan to make an appointment with the office within 1 week for an incision check and 6 weeks for postpartum follow-up. Postpartum precautions were given at the time of discharge.  Secondary Diagnosis:	Breech presentation  Secondary Diagnosis:	Failed external cephalic version   1

## 2025-01-08 NOTE — DISCHARGE NOTE OB - HOSPITAL COURSE
Patient underwent a primary  delivery secondary to breech presentation and failed external cephalic version with the patient then going into labor. Post-op course was uncomplicated. Pain is well controlled with PRN medication. She has no difficulty with ambulation, voiding, or PO intake. Lab values and vital signs are within normal limits prior to discharge.

## 2025-01-08 NOTE — DISCHARGE NOTE OB - MEDICATION SUMMARY - MEDICATIONS TO TAKE
I will START or STAY ON the medications listed below when I get home from the hospital:    ibuprofen 600 mg oral tablet  -- 1 tab(s) by mouth every 6 hours  -- Indication: For moderate pain    Tylenol 325 mg oral tablet  -- 3 tab(s) by mouth every 6 hours  -- Indication: For mild pain

## 2025-01-08 NOTE — DISCHARGE NOTE OB - FINANCIAL ASSISTANCE
Health system provides services at a reduced cost to those who are determined to be eligible through Health system’s financial assistance program. Information regarding Health system’s financial assistance program can be found by going to [email or web address] or by calling [phone].

## 2025-01-08 NOTE — DISCHARGE NOTE OB - PATIENT PORTAL LINK FT
You can access the FollowMyHealth Patient Portal offered by [facility] by registering at the following website: [email or web address]. By joining [facility]’s FollowMyHealth portal, you will also be able to view your health information using other applications (apps) compatible with our system.

## 2025-01-09 VITALS
SYSTOLIC BLOOD PRESSURE: 96 MMHG | DIASTOLIC BLOOD PRESSURE: 61 MMHG | OXYGEN SATURATION: 98 % | RESPIRATION RATE: 18 BRPM | TEMPERATURE: 98 F | HEART RATE: 58 BPM

## 2025-01-09 PROCEDURE — 59050 FETAL MONITOR W/REPORT: CPT

## 2025-01-09 PROCEDURE — 86901 BLOOD TYPING SEROLOGIC RH(D): CPT

## 2025-01-09 PROCEDURE — 85025 COMPLETE CBC W/AUTO DIFF WBC: CPT

## 2025-01-09 PROCEDURE — C1889: CPT

## 2025-01-09 PROCEDURE — 86850 RBC ANTIBODY SCREEN: CPT

## 2025-01-09 PROCEDURE — 36415 COLL VENOUS BLD VENIPUNCTURE: CPT

## 2025-01-09 PROCEDURE — 86900 BLOOD TYPING SEROLOGIC ABO: CPT

## 2025-01-09 PROCEDURE — 86780 TREPONEMA PALLIDUM: CPT

## 2025-01-09 PROCEDURE — 90656 IIV3 VACC NO PRSV 0.5 ML IM: CPT

## 2025-01-09 RX ADMIN — ACETAMINOPHEN 975 MILLIGRAM(S): 80 SOLUTION/ DROPS ORAL at 08:48

## 2025-01-09 RX ADMIN — Medication 600 MILLIGRAM(S): at 05:55

## 2025-01-09 NOTE — PROGRESS NOTE ADULT - SUBJECTIVE AND OBJECTIVE BOX
MANPREET MALLOY is a 24y  now POD#1 s/p primary  section at 38w1d for breech presentation, secondary to failed ECV with patient proceeding to labor.     S:    No acute events overnight.   The patient has no complaints.  Pain controlled with current treatment regimen.   She is ambulating without difficulty and tolerating PO.   + flatus/-BM  TOV @1200  She endorses appropriate lochia, which is decreasing.   She is breastfeeding without difficulty.   She denies fevers, chills, nausea and vomiting.   She denies lightheadedness, dizziness, palpitations, chest pain and SOB.     O:    T(C): 36.9 (25 @ 05:02), Max: 37.2 (25 @ 20:45)  HR: 58 (25 @ 05:02) (55 - 116)  BP: 102/63 (25 @ 05:02) (91/66 - 114/76)  RR: 18 (25 @ 05:02) (14 - 22)  SpO2: 97% (25 @ 05:02) (97% - 100%)    Gen: NAD, AOx3  Pulm: Resting comfortably on room air  Abdomen:  Soft, non-tender, non-distended  Incision: Clean/dry/intact   Uterus:  Fundus firm below umbilicus  VE:  Expectant lochia  Ext:  Non-tender and non-edematous                          13.1   15.79 )-----------( 276      ( 2025 05:06 )             39.0             
    Subjective:  The patient feels well.  She is ambulating without difficulty.  She is tolerating PO.  She is voiding.  She denies nausea and vomiting.  Her pain is controlled.  She reports normal postpartum bleeding      Physical exam:    Vital Signs Last 24 Hrs  T(C): 36.6 (09 Jan 2025 04:00), Max: 37.4 (08 Jan 2025 15:46)  T(F): 97.9 (09 Jan 2025 04:00), Max: 99.3 (08 Jan 2025 15:46)  HR: 58 (09 Jan 2025 04:00) (58 - 89)  BP: 96/61 (09 Jan 2025 04:00) (84/52 - 98/61)  BP(mean): --  RR: 18 (09 Jan 2025 04:00) (16 - 18)  SpO2: 98% (09 Jan 2025 04:00) (95% - 98%)    Parameters below as of 09 Jan 2025 04:00  Patient On (Oxygen Delivery Method): room air        Gen: NAD  Abdomen: Soft, nontender, no distension , firm uterine fundus at umbilicus.  Incision: Clean, dry, and intact with steri strips  Pelvic: Normal lochia noted  Ext: No calf tenderness    LABS:                        13.1   15.79 )-----------( 276      ( 08 Jan 2025 05:06 )             39.0     blood type    A/P POD # 2 s/p    Doing well.  Encourage ambulation.  Discharge home today.  Prescriptions for percocet and motrin electronically sent to the pharmacy.  F/U in 2 weeks for incision check.  Call for fevers, chills, nausea, vomiting, heavy vaginal bleeding, vaginal discharge, severe pain, symptoms of depression, problems with incision or any other concerning symptoms.  Nothing in vagina and no heavy lifting x 6 weeks.  No driving x 2 weeks.  Questions answered.                     
MANPREET MALLOY is a 24y  now POD#2 s/p primary  section at 38w1d for breech presentation secondary to a failed ECV, with patient then going into labor.    S:    No acute events overnight.   The patient has no complaints.  Pain controlled with current treatment regimen.   She is ambulating without difficulty and tolerating PO.   + flatus/-BM/+ voiding   She endorses appropriate lochia, which is decreasing.   She is breastfeeding without difficulty.   She denies fevers, chills, nausea and vomiting.   She denies lightheadedness, dizziness, palpitations, chest pain and SOB.     O:    T(C): 36.6 (25 @ 04:00), Max: 37.4 (25 @ 15:46)  HR: 58 (25 @ 04:00) (58 - 89)  BP: 96/61 (25 @ 04:00) (84/52 - 98/61)  RR: 18 (25 @ 04:00) (16 - 18)  SpO2: 98% (25 @ 04:00) (95% - 98%)    Gen: NAD, AOx3  Pulm: Resting comfortably on room air  Abdomen:  Soft, non-tender, non-distended  Incision: Clean/dry/intact   Uterus:  Fundus firm below umbilicus  VE:  Expectant lochia  Ext:  Non-tender and non-edematous                          13.1   15.79 )-----------( 276      ( 2025 05:06 )             39.0

## 2025-01-09 NOTE — PROGRESS NOTE ADULT - ASSESSMENT
A/P: MANPREET MALLOY is a 24y  now POD#1 s/p primary  section at 38w1d for breech presentation, secondary to failed ECV with patient proceeding to labor.      -Vital signs stable  -Hgb: 14>12.1   -Tolerating PO, bowel function wnl  -F/U TOV at 1200  -Advance care as tolerated   -Continue routine postpartum and postoperative care and education  -Healthy female infant  -Dispo: Continue inpatient care
A/P:  MANPREET MALLOY is a 24y  now POD#2 s/p primary  section at 38w1d for breech presentation secondary to a failed ECV, with patient then going into labor.    -Vital signs stable  -Hgb: 14>13.1  -Voiding, tolerating PO, bowel function nml   -Advance care as tolerated   -Continue routine postpartum and postoperative care and education  -Healthy female infant  -Dispo: Pt is stable, doing well and meeting all postpartum and postoperative milestones. Possible discharge to home today pending attending approval.

## 2025-01-14 ENCOUNTER — APPOINTMENT (OUTPATIENT)
Dept: ANTEPARTUM | Facility: CLINIC | Age: 25
End: 2025-01-14

## 2025-04-14 NOTE — OB RN PATIENT PROFILE - CAREGIVER
Per CMM: Electronic prior authorization Not Supported as NDC not valid.    Submitted PA for [employer] Plus Sensor , via FAX to [facility]. STATUS: PENDING .   Declines